# Patient Record
Sex: FEMALE | Race: WHITE | NOT HISPANIC OR LATINO | Employment: FULL TIME | URBAN - METROPOLITAN AREA
[De-identification: names, ages, dates, MRNs, and addresses within clinical notes are randomized per-mention and may not be internally consistent; named-entity substitution may affect disease eponyms.]

---

## 2019-03-02 ENCOUNTER — HOSPITAL ENCOUNTER (OUTPATIENT)
Dept: URGENT CARE | Facility: CLINIC | Age: 24
Discharge: HOME | End: 2019-03-02
Attending: FAMILY MEDICINE

## 2019-03-04 LAB — BACTERIA SPEC AEROBE CULT: NORMAL

## 2020-09-10 ENCOUNTER — HOSPITAL ENCOUNTER (OUTPATIENT)
Dept: LABOR AND DELIVERY | Facility: HOSPITAL | Age: 25
Discharge: HOME | End: 2020-09-10
Attending: OBSTETRICS & GYNECOLOGY

## 2020-09-10 LAB
ALBUMIN SERPL-MCNC: 2.9 G/DL (ref 3.5–5)
ALBUMIN/GLOB SERPL: 1.1 {RATIO} (ref 1.4–2.6)
ALP SERPL-CCNC: 129 U/L (ref 42–98)
ALT SERPL-CCNC: <5 U/L (ref 10–40)
ANION GAP SERPL CALC-SCNC: 14 MMOL/L (ref 8–19)
AST SERPL-CCNC: 9 U/L (ref 15–50)
BASOPHILS # BLD AUTO: 0.02 10*3/UL (ref 0–0.2)
BASOPHILS NFR BLD AUTO: 0.2 % (ref 0–3)
BILIRUB SERPL-MCNC: 0.19 MG/DL (ref 0.2–1.3)
BUN SERPL-MCNC: 4 MG/DL (ref 5–25)
BUN/CREAT SERPL: 8 {RATIO} (ref 6–20)
CALCIUM SERPL-MCNC: 9.3 MG/DL (ref 8.7–10.4)
CHLORIDE SERPL-SCNC: 105 MMOL/L (ref 99–111)
CONV ABS IMM GRAN: 0.06 10*3/UL (ref 0–0.2)
CONV CO2: 21 MMOL/L (ref 22–32)
CONV CREATININE URINE, RANDOM: 219.1 MG/DL (ref 10–300)
CONV IMMATURE GRAN: 0.5 % (ref 0–1.8)
CONV PROTEIN TO CREATININE RATIO (RANDOM URINE): 0.21 {RATIO} (ref 0–0.1)
CONV TOTAL PROTEIN: 5.6 G/DL (ref 6.3–8.2)
CREAT UR-MCNC: 0.52 MG/DL (ref 0.5–0.9)
DEPRECATED RDW RBC AUTO: 40.8 FL (ref 36.4–46.3)
EOSINOPHIL # BLD AUTO: 0.08 10*3/UL (ref 0–0.7)
EOSINOPHIL # BLD AUTO: 0.7 % (ref 0–7)
ERYTHROCYTE [DISTWIDTH] IN BLOOD BY AUTOMATED COUNT: 12.8 % (ref 11.7–14.4)
GFR SERPLBLD BASED ON 1.73 SQ M-ARVRAT: >60 ML/MIN/{1.73_M2}
GLOBULIN UR ELPH-MCNC: 2.7 G/DL (ref 2–3.5)
GLUCOSE SERPL-MCNC: 109 MG/DL (ref 65–99)
HCT VFR BLD AUTO: 30.6 % (ref 37–47)
HGB BLD-MCNC: 10.1 G/DL (ref 12–16)
LYMPHOCYTES # BLD AUTO: 1.66 10*3/UL (ref 1–5)
LYMPHOCYTES NFR BLD AUTO: 15.2 % (ref 20–45)
MCH RBC QN AUTO: 29.1 PG (ref 27–31)
MCHC RBC AUTO-ENTMCNC: 33 G/DL (ref 33–37)
MCV RBC AUTO: 88.2 FL (ref 81–99)
MONOCYTES # BLD AUTO: 0.79 10*3/UL (ref 0.2–1.2)
MONOCYTES NFR BLD AUTO: 7.2 % (ref 3–10)
NEUTROPHILS # BLD AUTO: 8.34 10*3/UL (ref 2–8)
NEUTROPHILS NFR BLD AUTO: 76.2 % (ref 30–85)
NRBC CBCN: 0 % (ref 0–0.7)
OSMOLALITY SERPL CALC.SUM OF ELEC: 279 MOSM/KG (ref 273–304)
PLATELET # BLD AUTO: 220 10*3/UL (ref 130–400)
PMV BLD AUTO: 10.2 FL (ref 9.4–12.3)
POTASSIUM SERPL-SCNC: 3.8 MMOL/L (ref 3.5–5.3)
PROT UR-MCNC: 45.1 MG/DL
RBC # BLD AUTO: 3.47 10*6/UL (ref 4.2–5.4)
SODIUM SERPL-SCNC: 136 MMOL/L (ref 135–147)
WBC # BLD AUTO: 10.95 10*3/UL (ref 4.8–10.8)

## 2021-11-15 ENCOUNTER — OFFICE VISIT (OUTPATIENT)
Dept: OBSTETRICS AND GYNECOLOGY | Facility: CLINIC | Age: 26
End: 2021-11-15

## 2021-11-15 ENCOUNTER — LAB (OUTPATIENT)
Dept: LAB | Facility: HOSPITAL | Age: 26
End: 2021-11-15

## 2021-11-15 ENCOUNTER — TELEPHONE (OUTPATIENT)
Dept: OBSTETRICS AND GYNECOLOGY | Facility: CLINIC | Age: 26
End: 2021-11-15

## 2021-11-15 VITALS
BODY MASS INDEX: 36.82 KG/M2 | WEIGHT: 221 LBS | HEIGHT: 65 IN | SYSTOLIC BLOOD PRESSURE: 119 MMHG | HEART RATE: 102 BPM | DIASTOLIC BLOOD PRESSURE: 81 MMHG

## 2021-11-15 DIAGNOSIS — N93.9 ABNORMAL UTERINE BLEEDING (AUB): Primary | ICD-10-CM

## 2021-11-15 DIAGNOSIS — Z72.0 TOBACCO ABUSE: Chronic | ICD-10-CM

## 2021-11-15 DIAGNOSIS — N93.9 ABNORMAL UTERINE BLEEDING (AUB): ICD-10-CM

## 2021-11-15 DIAGNOSIS — E66.9 OBESITY (BMI 30-39.9): ICD-10-CM

## 2021-11-15 DIAGNOSIS — N91.2 AMENORRHEA: ICD-10-CM

## 2021-11-15 LAB
DEPRECATED RDW RBC AUTO: 40.7 FL (ref 37–54)
ERYTHROCYTE [DISTWIDTH] IN BLOOD BY AUTOMATED COUNT: 12 % (ref 12.3–15.4)
HCG INTACT+B SERPL-ACNC: <0.5 MIU/ML
HCT VFR BLD AUTO: 38.1 % (ref 34–46.6)
HGB BLD-MCNC: 13.5 G/DL (ref 12–15.9)
MCH RBC QN AUTO: 32.8 PG (ref 26.6–33)
MCHC RBC AUTO-ENTMCNC: 35.4 G/DL (ref 31.5–35.7)
MCV RBC AUTO: 92.5 FL (ref 79–97)
PLATELET # BLD AUTO: 292 10*3/MM3 (ref 140–450)
PMV BLD AUTO: 10.8 FL (ref 6–12)
PROLACTIN SERPL-MCNC: 9.14 NG/ML (ref 4.79–23.3)
RBC # BLD AUTO: 4.12 10*6/MM3 (ref 3.77–5.28)
T4 FREE SERPL-MCNC: 1.09 NG/DL (ref 0.93–1.7)
TSH SERPL DL<=0.05 MIU/L-ACNC: 1.35 UIU/ML (ref 0.27–4.2)
WBC # BLD AUTO: 13.95 10*3/MM3 (ref 3.4–10.8)

## 2021-11-15 PROCEDURE — 36415 COLL VENOUS BLD VENIPUNCTURE: CPT

## 2021-11-15 PROCEDURE — 84146 ASSAY OF PROLACTIN: CPT

## 2021-11-15 PROCEDURE — 99214 OFFICE O/P EST MOD 30 MIN: CPT | Performed by: OBSTETRICS & GYNECOLOGY

## 2021-11-15 PROCEDURE — 84702 CHORIONIC GONADOTROPIN TEST: CPT

## 2021-11-15 PROCEDURE — 84443 ASSAY THYROID STIM HORMONE: CPT

## 2021-11-15 PROCEDURE — 85027 COMPLETE CBC AUTOMATED: CPT

## 2021-11-15 PROCEDURE — 84439 ASSAY OF FREE THYROXINE: CPT

## 2021-11-15 RX ORDER — MEDROXYPROGESTERONE ACETATE 10 MG/1
5 TABLET ORAL DAILY
Qty: 5 TABLET | Refills: 0 | Status: SHIPPED | OUTPATIENT
Start: 2021-11-15 | End: 2021-11-20

## 2021-11-18 PROBLEM — E66.9 OBESITY (BMI 30-39.9): Chronic | Status: ACTIVE | Noted: 2021-11-15

## 2021-11-18 PROBLEM — N93.9 ABNORMAL UTERINE BLEEDING (AUB): Chronic | Status: ACTIVE | Noted: 2021-11-15

## 2021-11-18 PROBLEM — Z72.0 TOBACCO ABUSE: Chronic | Status: ACTIVE | Noted: 2021-11-18

## 2021-11-19 NOTE — ASSESSMENT & PLAN NOTE
Discussed diet, nutrition, exercise and weight loss and how weight loss can positively impact the patient's menstrual cycle regularity

## 2021-12-09 ENCOUNTER — OFFICE VISIT (OUTPATIENT)
Dept: OBSTETRICS AND GYNECOLOGY | Facility: CLINIC | Age: 26
End: 2021-12-09

## 2021-12-09 VITALS
BODY MASS INDEX: 37.32 KG/M2 | DIASTOLIC BLOOD PRESSURE: 83 MMHG | SYSTOLIC BLOOD PRESSURE: 129 MMHG | HEART RATE: 96 BPM | WEIGHT: 224 LBS | HEIGHT: 65 IN

## 2021-12-09 DIAGNOSIS — N91.2 AMENORRHEA: ICD-10-CM

## 2021-12-09 DIAGNOSIS — N93.9 ABNORMAL UTERINE BLEEDING (AUB): Primary | Chronic | ICD-10-CM

## 2021-12-09 DIAGNOSIS — Z30.09 ENCOUNTER FOR COUNSELING REGARDING CONTRACEPTION: ICD-10-CM

## 2021-12-09 DIAGNOSIS — E66.9 OBESITY (BMI 30-39.9): Chronic | ICD-10-CM

## 2021-12-09 DIAGNOSIS — Z72.0 TOBACCO ABUSE: Chronic | ICD-10-CM

## 2021-12-09 PROCEDURE — 99214 OFFICE O/P EST MOD 30 MIN: CPT | Performed by: OBSTETRICS & GYNECOLOGY

## 2021-12-09 NOTE — PROGRESS NOTES
"GYN Visit    CC: Follow-up ultrasound    HPI:   26 y.o. who presents in follow-up of abnormal uterine bleeding/amenorrhea.  The patient was given a Provera withdrawal.  She did have 2 days of bleeding in response to the Provera.  This was very light.  She has had no bleeding since.  She has no other complaints.  She does desire contraception.    History: PMHx, Meds, Allergies, PSHx, Social Hx, and POBHx all reviewed and updated.    /83   Pulse 96   Ht 165.1 cm (65\")   Wt 102 kg (224 lb)   LMP  (LMP Unknown)   Breastfeeding No   BMI 37.28 kg/m²     Physical Exam  Vitals and nursing note reviewed.   Constitutional:       General: She is not in acute distress.     Appearance: Normal appearance. She is obese. She is not ill-appearing.   Abdominal:      General: Abdomen is flat. There is no distension.      Palpations: Abdomen is soft. There is no mass.      Tenderness: There is no abdominal tenderness. There is no guarding or rebound.      Hernia: No hernia is present.   Musculoskeletal:         General: No swelling.      Right lower leg: No edema.      Left lower leg: No edema.   Skin:     General: Skin is warm and dry.   Neurological:      Mental Status: She is alert and oriented to person, place, and time.   Psychiatric:         Mood and Affect: Mood normal.         Behavior: Behavior normal.         Thought Content: Thought content normal.     Ultrasound 2021: Indication abnormal uterine bleeding/amenorrhea.  Comparisons none.  The uterus is 8.3 cm x 5.4 cm x 3.9 cm.  The endometrium is 5.2 mm.  The left ovary 3.0 cm x 3.5 cm and the right ovary is 2.2 cm x 1.1 cm.  There is a single anterior subserosal fibroid measuring 2.1 x 1.5 cm.  There is a simple cyst arising from the left ovary measuring 2.4 x 1.8 cm.  The uterus is anteverted.  There was an echogenic area within the endometrial lining.  This measured 5 x 3 mm.  This could represent an endometrial polyp, although this is thought to " be less likely due to the thin endometrium.      ASSESSMENT AND PLAN:  Diagnoses and all orders for this visit:    1. Abnormal uterine bleeding (AUB) (Primary)  Assessment & Plan:  The patient's endometrium is thin  This is likely why she had only light bleeding with her Provera withdrawal.  I suspect her AUB is likely secondary to anovulation cycles.  The patient is interested in proceeding with contraception as well as medication to control her cycles.  After reviewing different options for managing both of these desires the patient has elected proceed with Xulane patches    Orders:  -     norelgestromin-ethinyl estradiol (ORTHO EVRA) 150-35 MCG/24HR; Place 1 patch on the skin as directed by provider 1 (One) Time Per Week.  Dispense: 3 patch; Refill: 12    2. Tobacco abuse  Assessment & Plan:  Smoking cessation counseling      3. Obesity (BMI 30-39.9)  Assessment & Plan:  Discussed nutrition, exercise and recommended weight loss.      4. Amenorrhea  Assessment & Plan:  The patient did have a response to the Provera albeit it was a very small response with only light bleeding for 2 days.      5. Encounter for counseling regarding contraception  Assessment & Plan:  Xulane patches  Apply 1 patch each week to the skin, rotating patch site.  Appropriate patch sites were reviewed with the patient.  Appropriate use was reviewed with the patient.  The risks, benefits, and alternatives of birth control patches were reviewed including increased risk for venous thromboembolism.        Counseling: TRACK MENSES, RTO if <q21d, >7d long, heavy or painful.    All BIRTH CONTROL options R/B/A/SE/E of each reviewed in detail.  OCP/hormone use risk THROMBOEMBOLIC RISK reviewed.     Weight loss/Nutrition reviewed.  Tobacco cessation reviewed.    Follow Up:  Return in about 3 months (around 3/9/2022) for Recheck.    Malick Sorto MD  12/09/2021

## 2021-12-10 PROBLEM — Z30.09 ENCOUNTER FOR COUNSELING REGARDING CONTRACEPTION: Status: ACTIVE | Noted: 2021-12-10

## 2021-12-10 NOTE — ASSESSMENT & PLAN NOTE
Xulane patches  Apply 1 patch each week to the skin, rotating patch site.  Appropriate patch sites were reviewed with the patient.  Appropriate use was reviewed with the patient.  The risks, benefits, and alternatives of birth control patches were reviewed including increased risk for venous thromboembolism.

## 2021-12-10 NOTE — ASSESSMENT & PLAN NOTE
The patient's endometrium is thin  This is likely why she had only light bleeding with her Provera withdrawal.  I suspect her AUB is likely secondary to anovulation cycles.  The patient is interested in proceeding with contraception as well as medication to control her cycles.  After reviewing different options for managing both of these desires the patient has elected proceed with Xulane patches

## 2021-12-10 NOTE — ASSESSMENT & PLAN NOTE
The patient did have a response to the Provera albeit it was a very small response with only light bleeding for 2 days.

## 2023-10-12 ENCOUNTER — TELEPHONE (OUTPATIENT)
Dept: OBSTETRICS AND GYNECOLOGY | Facility: CLINIC | Age: 28
End: 2023-10-12
Payer: COMMERCIAL

## 2023-10-12 NOTE — TELEPHONE ENCOUNTER
Called patient to schedule ob ultrasound per protocol. Patient informed me that she has started her ob care with Dr. Shelby. I informed patient that the practice is not accepting transfer of ob care. Patients New Ob appt has been canceled.

## 2023-10-13 ENCOUNTER — TELEPHONE (OUTPATIENT)
Dept: OBSTETRICS AND GYNECOLOGY | Facility: CLINIC | Age: 28
End: 2023-10-13
Payer: COMMERCIAL

## 2023-10-13 NOTE — TELEPHONE ENCOUNTER
L/m for pt to call back.      Ok to schedule first available New Ob w/provider of pt's choice.  Approx 14 wks, seen at [facility].      Will pt have a problem coming to [place] for appointments once per month until 28 wks along, then once every two weeks until 36 wks along, then weekly until delivery?      Pt # [phone]

## 2023-11-07 ENCOUNTER — INITIAL PRENATAL (OUTPATIENT)
Dept: OBSTETRICS AND GYNECOLOGY | Facility: CLINIC | Age: 28
End: 2023-11-07
Payer: COMMERCIAL

## 2023-11-07 VITALS — SYSTOLIC BLOOD PRESSURE: 129 MMHG | DIASTOLIC BLOOD PRESSURE: 75 MMHG | BODY MASS INDEX: 38.77 KG/M2 | WEIGHT: 233 LBS

## 2023-11-07 DIAGNOSIS — Z87.59 HISTORY OF POSTPARTUM HEMORRHAGE: ICD-10-CM

## 2023-11-07 DIAGNOSIS — O98.512 COVID-19 AFFECTING PREGNANCY IN SECOND TRIMESTER: ICD-10-CM

## 2023-11-07 DIAGNOSIS — Z98.891 PREVIOUS CESAREAN SECTION: ICD-10-CM

## 2023-11-07 DIAGNOSIS — Z3A.18 18 WEEKS GESTATION OF PREGNANCY: ICD-10-CM

## 2023-11-07 DIAGNOSIS — U07.1 COVID-19 AFFECTING PREGNANCY IN SECOND TRIMESTER: ICD-10-CM

## 2023-11-07 DIAGNOSIS — O99.332 MATERNAL TOBACCO USE IN SECOND TRIMESTER: ICD-10-CM

## 2023-11-07 DIAGNOSIS — Z34.82 MULTIGRAVIDA IN SECOND TRIMESTER: Primary | ICD-10-CM

## 2023-11-07 LAB
EXPIRATION DATE: NORMAL
GLUCOSE UR STRIP-MCNC: NEGATIVE MG/DL
Lab: NORMAL
PROT UR STRIP-MCNC: NEGATIVE MG/DL

## 2023-11-07 NOTE — PROGRESS NOTES
"Cc:  Transfer care.  Patient was not on birth control at the time of conception.  She is transferring care from Dr. Shelby.  Pregnancy has been uncomplicated to this point.  Patient with some \"growing\" pains and anatomic changes.  No bleeding or spotting.  Past medical, surgical, obstetric, genetic, social and family histories reviewed by me.  Allergies and medications reviewed.  Physical exam documented by me.  Prenatal records reviewed.  A/P:  IUP at 18 weeks with previous  complicated by postpartum hemorrhage, maternal tobacco use.  - C/S.  From review of records, patient had arrest disorder.  She dilated to 6 cm and did not progress after 4+ hours with adequate labor.  She is suboptimal TOLAC candidate; however, if infant is SGA and she has a favorable cervix, TOLAC can be considered.  - PPH.  Unknown etiology.  Likely atony.  Discussed increased risks in successive pregnancy.  Will ensure that anemia is managed antepartum and prepared for increased risks in this pregnancy.  - Maternal tobacco.  Recommended patient quit smoking and discussed risks including IUGR, PTD, IUFD.  - Recommended vaccinations.  Patient had Covid in current pregnancy.  - Proceed with dental work as discussed.  "

## 2023-11-28 ENCOUNTER — ROUTINE PRENATAL (OUTPATIENT)
Dept: OBSTETRICS AND GYNECOLOGY | Facility: CLINIC | Age: 28
End: 2023-11-28
Payer: COMMERCIAL

## 2023-11-28 VITALS — WEIGHT: 236 LBS | BODY MASS INDEX: 39.27 KG/M2 | DIASTOLIC BLOOD PRESSURE: 79 MMHG | SYSTOLIC BLOOD PRESSURE: 130 MMHG

## 2023-11-28 DIAGNOSIS — Z3A.21 21 WEEKS GESTATION OF PREGNANCY: Primary | ICD-10-CM

## 2023-11-28 DIAGNOSIS — Z98.891 PREVIOUS CESAREAN SECTION: ICD-10-CM

## 2023-11-28 DIAGNOSIS — Z34.82 MULTIGRAVIDA IN SECOND TRIMESTER: ICD-10-CM

## 2023-11-28 DIAGNOSIS — Z23 FLU VACCINE NEED: ICD-10-CM

## 2023-11-28 DIAGNOSIS — O99.332 MATERNAL TOBACCO USE IN SECOND TRIMESTER: ICD-10-CM

## 2023-11-28 NOTE — PROGRESS NOTES
Cc:  Pregnancy follow up.  No complaints.  Patient had a wisdom tooth extracted last week.  She reports some fetal movement.  No bleeding.  Vitals reviewed by me.  Gen - alert and pleasant.  Abdomen - nontender.  Ultrasound with completed anatomy.  A/P:  IUP at 22 weeks with previous C/S  - Patient received Flu vaccine today.   - Reviewed records from delivery in [facility].  Patient appears to have had an arrest disorder.  Discussed evaluating fetal weight in 3rd trimester and deciding on plan for delivery.  - Discussed maternal well being.

## 2023-11-29 ENCOUNTER — REFERRAL TRIAGE (OUTPATIENT)
Dept: LABOR AND DELIVERY | Facility: HOSPITAL | Age: 28
End: 2023-11-29
Payer: COMMERCIAL

## 2023-12-18 ENCOUNTER — PATIENT OUTREACH (OUTPATIENT)
Dept: LABOR AND DELIVERY | Facility: HOSPITAL | Age: 28
End: 2023-12-18
Payer: COMMERCIAL

## 2023-12-18 NOTE — OUTREACH NOTE
[facility]  Enrollment    Current Estimated Gestational Age: 25w2d    Questions/Answers      Flowsheet Row Responses   Would like to participate? 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 Yes   Dentist Name [facility]   Next Appointment: 12/20/23   Resources Presently Utilizing: WIC (Women, Infant, Children)   Maternal Warning Signs Provided   Other Education HANDS, WIC Benefits, Insurance benefits/Incentives, Smoking/Vaping Cessation            Learning Assessment      Flowsheet Row Responses   Relationship Patient   Learner Name Cammy   Does the learner have any barriers to learning? No Barriers   What is the preferred language of the learner for medical teaching? English   Is an  required? No   How does the learner prefer to learn new concepts? 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 good   May I ask you questions about your substance use? Yes   Other Comment 1/2 PPD smoker   Supplies ready for baby Crib   Resource/Environmental Concerns None   Do you have any questions related to your care experience, your pregnancy, plans for delivery, any concerns, etc? No   Other Education HANDS, WIC Benefits, Insurance benefits/Incentives, Smoking/Vaping Cessation          [facility]  updated and reviewed with the patient during this program:  Financial Resource Strain: Medium Risk (12/18/2023)    Overall Financial Resource Strain (CARDIA)     Difficulty of Paying Living Expenses: Somewhat hard      Food Insecurity: Food Insecurity Present (12/18/2023)    Hunger Vital Sign     Worried About Running Out of Food in the Last Year: Often true     Ran Out of Food in the Last Year: Never true      Social Connections: Not At Risk (12/18/2023)    Family and Community Support     Help with Day-to-Day Activities: I don't need any help     Lonely or Isolated: Sometimes      Transportation Needs: No Transportation Needs (12/18/2023)    PRAPARE - Transportation     Lack of Transportation (Medical): No     Lack of Transportation (Non-Medical): No      Housing Stability: Not At Risk (12/18/2023)    Housing Stability     Current Living Arrangements: other (see comments)     Potentially Unsafe Housing Conditions: none      Stress: Not on file       Intake completed today. She lives with the FOB and daughter and reports stable housing and reliable transportation. She receives WIC/SNAP. Reviewed ordering a breast pump and contacting [employer] about car seat. She is interested in TOLAC if possible, answered questions. Reviewed maternal warning signs and discussed smoking cessation. F/u in one month.     Inga Vanessa RN  Maternity Nurse Navigator    12/18/2023, 16:13 EST

## 2023-12-27 ENCOUNTER — ROUTINE PRENATAL (OUTPATIENT)
Dept: OBSTETRICS AND GYNECOLOGY | Facility: CLINIC | Age: 28
End: 2023-12-27
Payer: COMMERCIAL

## 2023-12-27 VITALS — SYSTOLIC BLOOD PRESSURE: 118 MMHG | DIASTOLIC BLOOD PRESSURE: 70 MMHG | BODY MASS INDEX: 39.77 KG/M2 | WEIGHT: 239 LBS

## 2023-12-27 DIAGNOSIS — Z34.82 MULTIGRAVIDA IN SECOND TRIMESTER: Primary | ICD-10-CM

## 2023-12-27 DIAGNOSIS — Z98.891 PREVIOUS CESAREAN SECTION: ICD-10-CM

## 2023-12-27 DIAGNOSIS — O99.332 MATERNAL TOBACCO USE IN SECOND TRIMESTER: ICD-10-CM

## 2023-12-27 DIAGNOSIS — Z3A.26 26 WEEKS GESTATION OF PREGNANCY: ICD-10-CM

## 2023-12-27 NOTE — PROGRESS NOTES
Cc:  Pregnancy follow up.  No complaints.  Good FM.  No bleeding or spotting.    Vitals reviewed by me.  Gen - alert and pleasant.  Abdomen - nontender, no guarding or rebound.  Patient needs to complete GTT1.  A/P:  IUP at 26+ weeks  - Glucola discussed.  Patient to complete in 1 to 2 weeks.  - Sonogram for growth in early 3rd trimester.  - Discussed materanl well being.

## 2024-01-06 LAB — GLUCOSE 1H P 50 G GLC PO SERPL-MCNC: 121 MG/DL (ref 70–139)

## 2024-01-15 ENCOUNTER — PATIENT OUTREACH (OUTPATIENT)
Dept: LABOR AND DELIVERY | Facility: HOSPITAL | Age: 29
End: 2024-01-15
Payer: COMMERCIAL

## 2024-01-15 NOTE — OUTREACH NOTE
[facility]  Check-In    Current Estimated Gestational Age: 29w2d      Questions/Answers      Flowsheet Row Responses   Best Method for Contacting Cell   Demographics Reviewed Yes   Currently Employed Yes   Able to keep appointments as scheduled Yes   Gender(s) and Name(s) girl   Baby Active/Feeling Fetal Movemen Yes   How are you presently feeling? good   Supplies ready for baby Crib   Resource/Environmental Concerns None   Do you have any questions related to your care experience, your pregnancy, plans for delivery, any concerns, etc? No   Other Education [facility] Benefits, Insurance benefits/Incentives          Pt doing well. We discussed fetal kick counts and she reports active fetal movement. She is still working on infant supplies and she will ask office to fax the order for her breast pump. F/u in one month.     Inga Vanessa RN  Maternity Nurse Navigator    1/15/2024, 12:41 EST

## 2024-01-16 ENCOUNTER — ROUTINE PRENATAL (OUTPATIENT)
Dept: OBSTETRICS AND GYNECOLOGY | Facility: CLINIC | Age: 29
End: 2024-01-16
Payer: COMMERCIAL

## 2024-01-16 VITALS — BODY MASS INDEX: 39.61 KG/M2 | WEIGHT: 238 LBS | DIASTOLIC BLOOD PRESSURE: 72 MMHG | SYSTOLIC BLOOD PRESSURE: 126 MMHG

## 2024-01-16 DIAGNOSIS — O99.513: ICD-10-CM

## 2024-01-16 DIAGNOSIS — O26.843 FUNDAL HEIGHT LOW FOR DATES IN THIRD TRIMESTER: ICD-10-CM

## 2024-01-16 DIAGNOSIS — Z98.891 PREVIOUS CESAREAN SECTION: ICD-10-CM

## 2024-01-16 DIAGNOSIS — Z34.83 MULTIGRAVIDA IN THIRD TRIMESTER: Primary | ICD-10-CM

## 2024-01-16 DIAGNOSIS — Z3A.29 29 WEEKS GESTATION OF PREGNANCY: ICD-10-CM

## 2024-01-16 DIAGNOSIS — O99.332 MATERNAL TOBACCO USE IN SECOND TRIMESTER: ICD-10-CM

## 2024-01-16 LAB
EXPIRATION DATE: ABNORMAL
GLUCOSE UR STRIP-MCNC: NEGATIVE MG/DL
Lab: ABNORMAL
PROT UR STRIP-MCNC: ABNORMAL MG/DL

## 2024-01-16 NOTE — PROGRESS NOTES
Cc:  Pregnancy follow up.  Patient with persistent cough since before , she states has taken a Covid test that she reports to have been negative. She reports possible influenza and RSV exposure.  No bleeding or spotting.  Fetal movement is excellent.  Vitals reviewed by me.  Gen - alert and pleasant.  Abdomen - nontender.  A/P:  IUP at 29 weeks with URI, fundal height low.  - URI.  A medication list was given.  Discussed importance of quitting smoking and increase duration of symptoms in pregnant women who smoke.  Recommended presenting to Urgent Care for testing.  - Tobacco use.  Discussed risks in pregnancy including IUGR, IUFD,  birth.  Encouraged to remain quit from smoking.  - Fundal height low.  Sonogram ordered.  - Discussed maternal well being.

## 2024-02-06 ENCOUNTER — ROUTINE PRENATAL (OUTPATIENT)
Dept: OBSTETRICS AND GYNECOLOGY | Facility: CLINIC | Age: 29
End: 2024-02-06
Payer: COMMERCIAL

## 2024-02-06 VITALS — BODY MASS INDEX: 39.61 KG/M2 | WEIGHT: 238 LBS | SYSTOLIC BLOOD PRESSURE: 137 MMHG | DIASTOLIC BLOOD PRESSURE: 78 MMHG

## 2024-02-06 DIAGNOSIS — Z3A.32 32 WEEKS GESTATION OF PREGNANCY: Primary | ICD-10-CM

## 2024-02-06 LAB
GLUCOSE UR STRIP-MCNC: NEGATIVE MG/DL
PROT UR STRIP-MCNC: NEGATIVE MG/DL

## 2024-02-06 PROCEDURE — 99213 OFFICE O/P EST LOW 20 MIN: CPT | Performed by: OBSTETRICS & GYNECOLOGY

## 2024-02-06 RX ORDER — PRENATAL VIT/IRON FUM/FOLIC AC 27MG-0.8MG
TABLET ORAL DAILY
COMMUNITY

## 2024-02-06 RX ORDER — AMOXICILLIN 875 MG/1
TABLET, COATED ORAL
COMMUNITY
Start: 2024-01-22

## 2024-02-06 NOTE — PROGRESS NOTES
Cc:  Pregnancy follow up.  Patient feels well.  Good FM.  No bleeding or spotting.  Vitals reviewed by me  Gen - alert and pleasant.  Abdomen - gravid, nontender  Ultrasound with normal growth, JUAN, cephalic.  A/P:  IUP at 32 weeks with previous  for arrest disorder.  - Infant is AGA for growth.  Therefore, she has a risk of arrest disorder if she attempts TOLAC.  We can continue to discuss but based on previous results, she had a normal size infant with arrest of labor.  She will continue to consider.  - Discussed maternal well being.

## 2024-02-22 ENCOUNTER — PATIENT OUTREACH (OUTPATIENT)
Dept: LABOR AND DELIVERY | Facility: HOSPITAL | Age: 29
End: 2024-02-22
Payer: COMMERCIAL

## 2024-02-22 NOTE — OUTREACH NOTE
[facility]  Unable to Reach       Questions/Answers      Flowsheet Row Responses   Pending Outreach Prenatal Check-in   Call Attempt First   Outcome Left message   Next Call Attempt Date 02/26/24            Inga Vanessa RN  Maternity Nurse Navigator    2/22/2024, 11:48 EST

## 2024-02-26 ENCOUNTER — PATIENT OUTREACH (OUTPATIENT)
Dept: LABOR AND DELIVERY | Facility: HOSPITAL | Age: 29
End: 2024-02-26
Payer: COMMERCIAL

## 2024-02-26 NOTE — OUTREACH NOTE
[facility]  Check-In    Current Estimated Gestational Age: 35w2d      Questions/Answers      Flowsheet Row Responses   Best Method for Contacting Cell   Demographics Reviewed Yes   Currently Employed Yes   Able to keep appointments as scheduled Yes   Gender(s) and Name(s) girl   How are you presently feeling? good   Supplies ready for baby Car Seat, Clothing, Crib, Diapers, Feeding Supplies   Resource/Environmental Concerns None   Do you have any questions related to your care experience, your pregnancy, plans for delivery, any concerns, etc? No   Other Education Insurance benefits/Incentives, WIC Benefits          Pt doing well, still deciding about  vs c/s and will discuss further with provider at next appt. She has all necessary infant supplies, just need to get her breast pump IP. Reviewed labor precautions and she reports active fetal movement. F/u inpatient after delivery.     Inga Vanessa RN  Maternity Nurse Navigator    2024, 14:21 EST

## 2024-02-27 ENCOUNTER — ROUTINE PRENATAL (OUTPATIENT)
Dept: OBSTETRICS AND GYNECOLOGY | Facility: CLINIC | Age: 29
End: 2024-02-27
Payer: COMMERCIAL

## 2024-02-27 VITALS — DIASTOLIC BLOOD PRESSURE: 90 MMHG | SYSTOLIC BLOOD PRESSURE: 138 MMHG | BODY MASS INDEX: 40.27 KG/M2 | WEIGHT: 242 LBS

## 2024-02-27 DIAGNOSIS — Z3A.35 35 WEEKS GESTATION OF PREGNANCY: ICD-10-CM

## 2024-02-27 DIAGNOSIS — Z34.83 MULTIGRAVIDA IN THIRD TRIMESTER: Primary | ICD-10-CM

## 2024-02-27 DIAGNOSIS — Z98.891 PREVIOUS CESAREAN SECTION: ICD-10-CM

## 2024-02-27 NOTE — PROGRESS NOTES
Cc:  Pregnancy follow up.  Patient seeing spots x1 week; she also c/o neck pain that radiates to the back of her head x2 weeks. Patient states she is having trouble seeing even with her glasses on but plans to make an eye appointment.  Fetal movement is excellent.  No bleeding or spotting.  No cramping/contractions.  Vitals reviewed by me.  Gen - alert and pleasant.  Abdomen - gravid, nontender.  A/P:  IUP at 35 weeks with previous , headache  - Previous C/S.  Situation complex.   Patient with previous arrest disorder.  Infant was AGA and sonogram measurements suggest similar circumstances.  Reviewed that if she is motivated to TOLAC/, she can consider but might have increased risks of arrest disorder.  One option is to schedule  near EDC and if spontaneous labor ensues previous to this, she could attempt to TOLAC.  The rationale for this was explored in dept.  Emphasized that previous  was likely not due to the induction but predetermined factors.  - Headache.  Likely musculoskeletal.  - Discussed maternal well being.    I spent greater than 45 minutes with patient reviewing pros/cons of  and issues with trial of labor.

## 2024-03-05 ENCOUNTER — ROUTINE PRENATAL (OUTPATIENT)
Dept: OBSTETRICS AND GYNECOLOGY | Facility: CLINIC | Age: 29
End: 2024-03-05
Payer: COMMERCIAL

## 2024-03-05 ENCOUNTER — TELEPHONE (OUTPATIENT)
Dept: OBSTETRICS AND GYNECOLOGY | Facility: CLINIC | Age: 29
End: 2024-03-05

## 2024-03-05 DIAGNOSIS — Z98.891 PREVIOUS CESAREAN SECTION: ICD-10-CM

## 2024-03-05 DIAGNOSIS — Z34.83 MULTIGRAVIDA IN THIRD TRIMESTER: Primary | ICD-10-CM

## 2024-03-05 DIAGNOSIS — Z3A.36 36 WEEKS GESTATION OF PREGNANCY: ICD-10-CM

## 2024-03-05 PROCEDURE — 99214 OFFICE O/P EST MOD 30 MIN: CPT | Performed by: OBSTETRICS & GYNECOLOGY

## 2024-03-05 NOTE — PROGRESS NOTES
Cc:  Pregnancy follow up.  No complaints.  Good FM.  No bleeding or spotting.   Good FM.   Vitals reviewed by me.  Gen - alert and pleasant.  Abdomen - gravid, nontender  GBS done.   A/P:  IUP at 36 weeks with previous  for arrest disorder.  - C/S.  ERAS information given.  Patient will consider TOLAC if she enters active labor prior to  date.  She has an increased risk of failed TOLAC due to history of arrest disorder.  Will make assessment of growth/EFW in 1 to 2 weeks.  - Discussed maternal well being.

## 2024-03-08 LAB — GP B STREP DNA SPEC QL NAA+PROBE: NEGATIVE

## 2024-03-12 ENCOUNTER — ROUTINE PRENATAL (OUTPATIENT)
Dept: OBSTETRICS AND GYNECOLOGY | Facility: CLINIC | Age: 29
End: 2024-03-12
Payer: COMMERCIAL

## 2024-03-12 VITALS — BODY MASS INDEX: 40.44 KG/M2 | DIASTOLIC BLOOD PRESSURE: 85 MMHG | SYSTOLIC BLOOD PRESSURE: 139 MMHG | WEIGHT: 243 LBS

## 2024-03-12 DIAGNOSIS — Z3A.37 37 WEEKS GESTATION OF PREGNANCY: ICD-10-CM

## 2024-03-12 DIAGNOSIS — Z98.891 PREVIOUS CESAREAN SECTION: ICD-10-CM

## 2024-03-12 DIAGNOSIS — Z34.83 MULTIGRAVIDA IN THIRD TRIMESTER: Primary | ICD-10-CM

## 2024-03-14 NOTE — PROGRESS NOTES
Cc:  Pregnancy follow up.  No complaints.  Good FM.  No bleeding.    Vitals reviewed by me.  Gen - alert and pleasant.  Abdomen - gravid, nontender.  Cervix - 2  A/P:  IUP at 37 weeks with previous   - Patient still with unfavorable cervix.  Patient also likely with android pelvis.  However, will reassess fetal weight next week.

## 2024-03-19 ENCOUNTER — ROUTINE PRENATAL (OUTPATIENT)
Dept: OBSTETRICS AND GYNECOLOGY | Facility: CLINIC | Age: 29
End: 2024-03-19
Payer: COMMERCIAL

## 2024-03-19 VITALS — SYSTOLIC BLOOD PRESSURE: 134 MMHG | BODY MASS INDEX: 40.27 KG/M2 | WEIGHT: 242 LBS | DIASTOLIC BLOOD PRESSURE: 80 MMHG

## 2024-03-19 DIAGNOSIS — Z98.891 PREVIOUS CESAREAN SECTION: ICD-10-CM

## 2024-03-19 DIAGNOSIS — Z34.83 MULTIGRAVIDA IN THIRD TRIMESTER: Primary | ICD-10-CM

## 2024-03-19 PROCEDURE — 99213 OFFICE O/P EST LOW 20 MIN: CPT | Performed by: OBSTETRICS & GYNECOLOGY

## 2024-03-19 NOTE — PROGRESS NOTES
Cc:  Pregnancy follow up.  Patient has had some nausea, mainly at night after dinner.  Good FM.  No bleeding or spotting.  Patient denies any significant contractions or pain.  Vitals reviewed by me.  Gen - alert and pleasant.  Abdomen - gravid, nontender  A/P:  IUP at 38+ weeks with previous .  - Patient is not planning to TOLAC at this stage.  Reviewed process for RLTC/S next week.  - Discussed maternal well being.

## 2024-03-20 ENCOUNTER — TELEPHONE (OUTPATIENT)
Dept: OBSTETRICS AND GYNECOLOGY | Facility: CLINIC | Age: 29
End: 2024-03-20
Payer: COMMERCIAL

## 2024-03-27 ENCOUNTER — ANESTHESIA (OUTPATIENT)
Dept: LABOR AND DELIVERY | Facility: HOSPITAL | Age: 29
End: 2024-03-27
Payer: COMMERCIAL

## 2024-03-27 ENCOUNTER — ANESTHESIA EVENT (OUTPATIENT)
Dept: LABOR AND DELIVERY | Facility: HOSPITAL | Age: 29
End: 2024-03-27
Payer: COMMERCIAL

## 2024-03-27 ENCOUNTER — HOSPITAL ENCOUNTER (INPATIENT)
Facility: HOSPITAL | Age: 29
LOS: 2 days | Discharge: HOME | End: 2024-03-29
Attending: OBSTETRICS & GYNECOLOGY | Admitting: OBSTETRICS & GYNECOLOGY
Payer: COMMERCIAL

## 2024-03-27 PROBLEM — Z98.891 PREVIOUS CESAREAN SECTION: Status: ACTIVE | Noted: 2024-03-27

## 2024-03-27 PROBLEM — Z34.90 PREGNANCY: Status: ACTIVE | Noted: 2024-03-27

## 2024-03-27 LAB
ABO GROUP BLD: NORMAL
BASOPHILS # BLD AUTO: 0.02 10*3/MM3 (ref 0–0.2)
BASOPHILS NFR BLD AUTO: 0.2 % (ref 0–1.5)
BLD GP AB SCN SERPL QL: NEGATIVE
DEPRECATED RDW RBC AUTO: 43.2 FL (ref 37–54)
EOSINOPHIL # BLD AUTO: 0.07 10*3/MM3 (ref 0–0.4)
EOSINOPHIL NFR BLD AUTO: 0.7 % (ref 0.3–6.2)
ERYTHROCYTE [DISTWIDTH] IN BLOOD BY AUTOMATED COUNT: 13.2 % (ref 12.3–15.4)
HCT VFR BLD AUTO: 34.4 % (ref 34–46.6)
HGB BLD-MCNC: 11.4 G/DL (ref 12–15.9)
IMM GRANULOCYTES # BLD AUTO: 0.05 10*3/MM3 (ref 0–0.05)
IMM GRANULOCYTES NFR BLD AUTO: 0.5 % (ref 0–0.5)
LYMPHOCYTES # BLD AUTO: 1.71 10*3/MM3 (ref 0.7–3.1)
LYMPHOCYTES NFR BLD AUTO: 15.9 % (ref 19.6–45.3)
MCH RBC QN AUTO: 29.8 PG (ref 26.6–33)
MCHC RBC AUTO-ENTMCNC: 33.1 G/DL (ref 31.5–35.7)
MCV RBC AUTO: 89.8 FL (ref 79–97)
MONOCYTES # BLD AUTO: 0.66 10*3/MM3 (ref 0.1–0.9)
MONOCYTES NFR BLD AUTO: 6.1 % (ref 5–12)
NEUTROPHILS NFR BLD AUTO: 76.6 % (ref 42.7–76)
NEUTROPHILS NFR BLD AUTO: 8.23 10*3/MM3 (ref 1.7–7)
NRBC BLD AUTO-RTO: 0 /100 WBC (ref 0–0.2)
PLATELET # BLD AUTO: 218 10*3/MM3 (ref 140–450)
PMV BLD AUTO: 9.9 FL (ref 6–12)
RBC # BLD AUTO: 3.83 10*6/MM3 (ref 3.77–5.28)
RH BLD: POSITIVE
T PALLIDUM IGG SER QL: NORMAL
T&S EXPIRATION DATE: NORMAL
WBC NRBC COR # BLD AUTO: 10.74 10*3/MM3 (ref 3.4–10.8)

## 2024-03-27 PROCEDURE — 25010000002 MORPHINE PER 10 MG: Performed by: ANESTHESIOLOGY

## 2024-03-27 PROCEDURE — 25010000002 ONDANSETRON PER 1 MG: Performed by: ANESTHESIOLOGY

## 2024-03-27 PROCEDURE — 25010000002 BUPIVACAINE PF 0.75 % SOLUTION: Performed by: ANESTHESIOLOGY

## 2024-03-27 PROCEDURE — 25010000002 CEFAZOLIN PER 500 MG: Performed by: OBSTETRICS & GYNECOLOGY

## 2024-03-27 PROCEDURE — 86850 RBC ANTIBODY SCREEN: CPT | Performed by: OBSTETRICS & GYNECOLOGY

## 2024-03-27 PROCEDURE — 25010000002 MORPHINE PER 10 MG: Performed by: NURSE ANESTHETIST, CERTIFIED REGISTERED

## 2024-03-27 PROCEDURE — 59515 CESAREAN DELIVERY: CPT | Performed by: OBSTETRICS & GYNECOLOGY

## 2024-03-27 PROCEDURE — 25010000002 KETOROLAC TROMETHAMINE PER 15 MG: Performed by: OBSTETRICS & GYNECOLOGY

## 2024-03-27 PROCEDURE — 86920 COMPATIBILITY TEST SPIN: CPT

## 2024-03-27 PROCEDURE — 85025 COMPLETE CBC W/AUTO DIFF WBC: CPT | Performed by: OBSTETRICS & GYNECOLOGY

## 2024-03-27 PROCEDURE — 25810000003 LACTATED RINGERS PER 1000 ML: Performed by: OBSTETRICS & GYNECOLOGY

## 2024-03-27 PROCEDURE — 86901 BLOOD TYPING SEROLOGIC RH(D): CPT | Performed by: OBSTETRICS & GYNECOLOGY

## 2024-03-27 PROCEDURE — 59514 CESAREAN DELIVERY ONLY: CPT | Performed by: OBSTETRICS & GYNECOLOGY

## 2024-03-27 PROCEDURE — 25010000002 FENTANYL CITRATE (PF) 50 MCG/ML SOLUTION: Performed by: ANESTHESIOLOGY

## 2024-03-27 PROCEDURE — 25810000003 LACTATED RINGERS SOLUTION: Performed by: OBSTETRICS & GYNECOLOGY

## 2024-03-27 PROCEDURE — 86900 BLOOD TYPING SEROLOGIC ABO: CPT | Performed by: OBSTETRICS & GYNECOLOGY

## 2024-03-27 PROCEDURE — 86780 TREPONEMA PALLIDUM: CPT | Performed by: OBSTETRICS & GYNECOLOGY

## 2024-03-27 DEVICE — ABSORBABLE HEMOSTAT (OXIDIZED REGENERATED CELLULOSE)
Type: IMPLANTABLE DEVICE | Site: UTERUS | Status: FUNCTIONAL
Brand: SURGICEL

## 2024-03-27 RX ORDER — SIMETHICONE 80 MG
80 TABLET,CHEWABLE ORAL 4 TIMES DAILY PRN
Status: DISCONTINUED | OUTPATIENT
Start: 2024-03-27 | End: 2024-03-29 | Stop reason: HOSPADM

## 2024-03-27 RX ORDER — DROPERIDOL 2.5 MG/ML
0.62 INJECTION, SOLUTION INTRAMUSCULAR; INTRAVENOUS
Status: DISCONTINUED | OUTPATIENT
Start: 2024-03-27 | End: 2024-03-29 | Stop reason: HOSPADM

## 2024-03-27 RX ORDER — SODIUM CHLORIDE 0.9 % (FLUSH) 0.9 %
10 SYRINGE (ML) INJECTION AS NEEDED
Status: DISCONTINUED | OUTPATIENT
Start: 2024-03-27 | End: 2024-03-27 | Stop reason: HOSPADM

## 2024-03-27 RX ORDER — KETOROLAC TROMETHAMINE 30 MG/ML
30 INJECTION, SOLUTION INTRAMUSCULAR; INTRAVENOUS ONCE
Status: COMPLETED | OUTPATIENT
Start: 2024-03-27 | End: 2024-03-27

## 2024-03-27 RX ORDER — OMEPRAZOLE 20 MG/1
20 CAPSULE, DELAYED RELEASE ORAL DAILY
COMMUNITY

## 2024-03-27 RX ORDER — TEMAZEPAM 7.5 MG/1
7.5 CAPSULE ORAL NIGHTLY PRN
Status: DISCONTINUED | OUTPATIENT
Start: 2024-03-27 | End: 2024-03-29 | Stop reason: HOSPADM

## 2024-03-27 RX ORDER — ONDANSETRON 2 MG/ML
4 INJECTION INTRAMUSCULAR; INTRAVENOUS EVERY 6 HOURS PRN
Status: DISCONTINUED | OUTPATIENT
Start: 2024-03-27 | End: 2024-03-29 | Stop reason: HOSPADM

## 2024-03-27 RX ORDER — OXYCODONE HYDROCHLORIDE 5 MG/1
5 TABLET ORAL EVERY 4 HOURS PRN
Status: DISCONTINUED | OUTPATIENT
Start: 2024-03-27 | End: 2024-03-29 | Stop reason: HOSPADM

## 2024-03-27 RX ORDER — ONDANSETRON 4 MG/1
4 TABLET, ORALLY DISINTEGRATING ORAL EVERY 8 HOURS PRN
Status: DISCONTINUED | OUTPATIENT
Start: 2024-03-27 | End: 2024-03-29 | Stop reason: HOSPADM

## 2024-03-27 RX ORDER — MORPHINE SULFATE 4 MG/ML
INJECTION, SOLUTION INTRAMUSCULAR; INTRAVENOUS
Status: COMPLETED | OUTPATIENT
Start: 2024-03-27 | End: 2024-03-27

## 2024-03-27 RX ORDER — CITRIC ACID/SODIUM CITRATE 334-500MG
30 SOLUTION, ORAL ORAL ONCE
Status: DISCONTINUED | OUTPATIENT
Start: 2024-03-27 | End: 2024-03-27 | Stop reason: HOSPADM

## 2024-03-27 RX ORDER — HYDROXYZINE 50 MG/1
50 TABLET, FILM COATED ORAL EVERY 6 HOURS PRN
Status: DISCONTINUED | OUTPATIENT
Start: 2024-03-27 | End: 2024-03-29 | Stop reason: HOSPADM

## 2024-03-27 RX ORDER — ONDANSETRON 2 MG/ML
4 INJECTION INTRAMUSCULAR; INTRAVENOUS ONCE AS NEEDED
Status: DISCONTINUED | OUTPATIENT
Start: 2024-03-27 | End: 2024-03-29 | Stop reason: HOSPADM

## 2024-03-27 RX ORDER — DIPHENHYDRAMINE HYDROCHLORIDE 50 MG/ML
25 INJECTION INTRAMUSCULAR; INTRAVENOUS EVERY 4 HOURS PRN
Status: DISCONTINUED | OUTPATIENT
Start: 2024-03-27 | End: 2024-03-29 | Stop reason: HOSPADM

## 2024-03-27 RX ORDER — ALUMINA, MAGNESIA, AND SIMETHICONE 2400; 2400; 240 MG/30ML; MG/30ML; MG/30ML
15 SUSPENSION ORAL EVERY 4 HOURS PRN
Status: DISCONTINUED | OUTPATIENT
Start: 2024-03-27 | End: 2024-03-29 | Stop reason: HOSPADM

## 2024-03-27 RX ORDER — BUPIVACAINE HYDROCHLORIDE 7.5 MG/ML
INJECTION, SOLUTION EPIDURAL; RETROBULBAR
Status: COMPLETED | OUTPATIENT
Start: 2024-03-27 | End: 2024-03-27

## 2024-03-27 RX ORDER — ERYTHROMYCIN 5 MG/G
OINTMENT OPHTHALMIC
Status: ACTIVE
Start: 2024-03-27 | End: 2024-03-27

## 2024-03-27 RX ORDER — FAMOTIDINE 10 MG/ML
20 INJECTION, SOLUTION INTRAVENOUS ONCE AS NEEDED
Status: COMPLETED | OUTPATIENT
Start: 2024-03-27 | End: 2024-03-27

## 2024-03-27 RX ORDER — OXYTOCIN/0.9 % SODIUM CHLORIDE 30/500 ML
999 PLASTIC BAG, INJECTION (ML) INTRAVENOUS ONCE
Status: COMPLETED | OUTPATIENT
Start: 2024-03-27 | End: 2024-03-27

## 2024-03-27 RX ORDER — PHYTONADIONE 1 MG/.5ML
INJECTION, EMULSION INTRAMUSCULAR; INTRAVENOUS; SUBCUTANEOUS
Status: ACTIVE
Start: 2024-03-27 | End: 2024-03-27

## 2024-03-27 RX ORDER — SODIUM CHLORIDE, SODIUM LACTATE, POTASSIUM CHLORIDE, CALCIUM CHLORIDE 600; 310; 30; 20 MG/100ML; MG/100ML; MG/100ML; MG/100ML
125 INJECTION, SOLUTION INTRAVENOUS CONTINUOUS
Status: DISCONTINUED | OUTPATIENT
Start: 2024-03-27 | End: 2024-03-27

## 2024-03-27 RX ORDER — FAMOTIDINE 10 MG/ML
20 INJECTION, SOLUTION INTRAVENOUS ONCE AS NEEDED
Status: DISCONTINUED | OUTPATIENT
Start: 2024-03-27 | End: 2024-03-27 | Stop reason: HOSPADM

## 2024-03-27 RX ORDER — OXYTOCIN/0.9 % SODIUM CHLORIDE 30/500 ML
125 PLASTIC BAG, INJECTION (ML) INTRAVENOUS ONCE AS NEEDED
Status: COMPLETED | OUTPATIENT
Start: 2024-03-27 | End: 2024-03-27

## 2024-03-27 RX ORDER — OXYTOCIN/0.9 % SODIUM CHLORIDE 30/500 ML
250 PLASTIC BAG, INJECTION (ML) INTRAVENOUS CONTINUOUS
Status: DISPENSED | OUTPATIENT
Start: 2024-03-27 | End: 2024-03-27

## 2024-03-27 RX ORDER — MORPHINE SULFATE 2 MG/ML
2 INJECTION, SOLUTION INTRAMUSCULAR; INTRAVENOUS
Status: ACTIVE | OUTPATIENT
Start: 2024-03-27 | End: 2024-03-27

## 2024-03-27 RX ORDER — ONDANSETRON 2 MG/ML
4 INJECTION INTRAMUSCULAR; INTRAVENOUS ONCE AS NEEDED
Status: COMPLETED | OUTPATIENT
Start: 2024-03-27 | End: 2024-03-27

## 2024-03-27 RX ORDER — ENOXAPARIN SODIUM 100 MG/ML
40 INJECTION SUBCUTANEOUS EVERY 12 HOURS
Status: DISCONTINUED | OUTPATIENT
Start: 2024-03-28 | End: 2024-03-29 | Stop reason: HOSPADM

## 2024-03-27 RX ORDER — IBUPROFEN 600 MG/1
600 TABLET ORAL EVERY 6 HOURS
Status: DISCONTINUED | OUTPATIENT
Start: 2024-03-28 | End: 2024-03-29 | Stop reason: HOSPADM

## 2024-03-27 RX ORDER — CITRIC ACID/SODIUM CITRATE 334-500MG
30 SOLUTION, ORAL ORAL ONCE
Status: COMPLETED | OUTPATIENT
Start: 2024-03-27 | End: 2024-03-27

## 2024-03-27 RX ORDER — CALCIUM CARBONATE 500 MG/1
1 TABLET, CHEWABLE ORAL EVERY 4 HOURS PRN
Status: DISCONTINUED | OUTPATIENT
Start: 2024-03-27 | End: 2024-03-29 | Stop reason: HOSPADM

## 2024-03-27 RX ORDER — TRANEXAMIC ACID 10 MG/ML
1000 INJECTION, SOLUTION INTRAVENOUS ONCE AS NEEDED
Status: DISCONTINUED | OUTPATIENT
Start: 2024-03-27 | End: 2024-03-27 | Stop reason: HOSPADM

## 2024-03-27 RX ORDER — MORPHINE SULFATE 1 MG/ML
INJECTION, SOLUTION EPIDURAL; INTRATHECAL; INTRAVENOUS AS NEEDED
Status: DISCONTINUED | OUTPATIENT
Start: 2024-03-27 | End: 2024-03-27 | Stop reason: SURG

## 2024-03-27 RX ORDER — PRENATAL VIT/IRON FUM/FOLIC AC 27MG-0.8MG
1 TABLET ORAL DAILY
Status: DISCONTINUED | OUTPATIENT
Start: 2024-03-27 | End: 2024-03-29 | Stop reason: HOSPADM

## 2024-03-27 RX ORDER — CARBOPROST TROMETHAMINE 250 UG/ML
250 INJECTION, SOLUTION INTRAMUSCULAR
Status: DISCONTINUED | OUTPATIENT
Start: 2024-03-27 | End: 2024-03-27 | Stop reason: HOSPADM

## 2024-03-27 RX ORDER — ACETAMINOPHEN 500 MG
1000 TABLET ORAL ONCE
Status: COMPLETED | OUTPATIENT
Start: 2024-03-27 | End: 2024-03-27

## 2024-03-27 RX ORDER — HYDROMORPHONE HYDROCHLORIDE 1 MG/ML
0.5 INJECTION, SOLUTION INTRAMUSCULAR; INTRAVENOUS; SUBCUTANEOUS
Status: DISCONTINUED | OUTPATIENT
Start: 2024-03-27 | End: 2024-03-27 | Stop reason: HOSPADM

## 2024-03-27 RX ORDER — FENTANYL CITRATE 50 UG/ML
INJECTION, SOLUTION INTRAMUSCULAR; INTRAVENOUS
Status: COMPLETED | OUTPATIENT
Start: 2024-03-27 | End: 2024-03-27

## 2024-03-27 RX ORDER — OXYCODONE HYDROCHLORIDE 10 MG/1
10 TABLET ORAL EVERY 4 HOURS PRN
Status: DISCONTINUED | OUTPATIENT
Start: 2024-03-27 | End: 2024-03-29 | Stop reason: HOSPADM

## 2024-03-27 RX ORDER — OXYTOCIN/0.9 % SODIUM CHLORIDE 30/500 ML
125 PLASTIC BAG, INJECTION (ML) INTRAVENOUS ONCE AS NEEDED
Status: DISCONTINUED | OUTPATIENT
Start: 2024-03-27 | End: 2024-03-29 | Stop reason: HOSPADM

## 2024-03-27 RX ORDER — MISOPROSTOL 200 UG/1
800 TABLET ORAL ONCE AS NEEDED
Status: DISCONTINUED | OUTPATIENT
Start: 2024-03-27 | End: 2024-03-27 | Stop reason: HOSPADM

## 2024-03-27 RX ORDER — ACETAMINOPHEN 325 MG/1
650 TABLET ORAL EVERY 6 HOURS
Status: DISCONTINUED | OUTPATIENT
Start: 2024-03-28 | End: 2024-03-29 | Stop reason: HOSPADM

## 2024-03-27 RX ORDER — DIPHENHYDRAMINE HCL 25 MG
25 CAPSULE ORAL EVERY 4 HOURS PRN
Status: DISCONTINUED | OUTPATIENT
Start: 2024-03-27 | End: 2024-03-29 | Stop reason: HOSPADM

## 2024-03-27 RX ORDER — ACETAMINOPHEN 500 MG
1000 TABLET ORAL EVERY 6 HOURS
Status: COMPLETED | OUTPATIENT
Start: 2024-03-27 | End: 2024-03-28

## 2024-03-27 RX ORDER — HYDROCORTISONE 25 MG/G
CREAM TOPICAL 3 TIMES DAILY PRN
Status: DISCONTINUED | OUTPATIENT
Start: 2024-03-27 | End: 2024-03-29 | Stop reason: HOSPADM

## 2024-03-27 RX ORDER — SODIUM CHLORIDE 9 MG/ML
40 INJECTION, SOLUTION INTRAVENOUS AS NEEDED
Status: DISCONTINUED | OUTPATIENT
Start: 2024-03-27 | End: 2024-03-27 | Stop reason: HOSPADM

## 2024-03-27 RX ORDER — NALOXONE HCL 0.4 MG/ML
0.2 VIAL (ML) INJECTION
Status: DISCONTINUED | OUTPATIENT
Start: 2024-03-27 | End: 2024-03-29 | Stop reason: HOSPADM

## 2024-03-27 RX ORDER — KETOROLAC TROMETHAMINE 15 MG/ML
15 INJECTION, SOLUTION INTRAMUSCULAR; INTRAVENOUS EVERY 6 HOURS
Status: DISPENSED | OUTPATIENT
Start: 2024-03-27 | End: 2024-03-28

## 2024-03-27 RX ORDER — METHYLERGONOVINE MALEATE 0.2 MG/ML
200 INJECTION INTRAVENOUS ONCE AS NEEDED
Status: DISCONTINUED | OUTPATIENT
Start: 2024-03-27 | End: 2024-03-27 | Stop reason: HOSPADM

## 2024-03-27 RX ADMIN — ONDANSETRON 4 MG: 2 INJECTION INTRAMUSCULAR; INTRAVENOUS at 09:23

## 2024-03-27 RX ADMIN — MORPHINE SULFATE 200 MCG: 4 INJECTION, SOLUTION INTRAMUSCULAR; INTRAVENOUS at 10:50

## 2024-03-27 RX ADMIN — BUPIVACAINE HYDROCHLORIDE 1.6 ML: 7.5 INJECTION, SOLUTION EPIDURAL; RETROBULBAR at 10:50

## 2024-03-27 RX ADMIN — MORPHINE SULFATE 2 MG: 1 INJECTION, SOLUTION EPIDURAL; INTRATHECAL; INTRAVENOUS at 11:17

## 2024-03-27 RX ADMIN — SODIUM CHLORIDE, POTASSIUM CHLORIDE, SODIUM LACTATE AND CALCIUM CHLORIDE 125 ML/HR: 600; 310; 30; 20 INJECTION, SOLUTION INTRAVENOUS at 08:38

## 2024-03-27 RX ADMIN — ACETAMINOPHEN 1000 MG: 500 TABLET ORAL at 09:23

## 2024-03-27 RX ADMIN — KETOROLAC TROMETHAMINE 30 MG: 30 INJECTION, SOLUTION INTRAMUSCULAR at 11:34

## 2024-03-27 RX ADMIN — ACETAMINOPHEN 1000 MG: 500 TABLET ORAL at 23:11

## 2024-03-27 RX ADMIN — SODIUM CHLORIDE 2000 MG: 900 INJECTION INTRAVENOUS at 10:06

## 2024-03-27 RX ADMIN — KETOROLAC TROMETHAMINE 15 MG: 15 INJECTION, SOLUTION INTRAMUSCULAR; INTRAVENOUS at 19:47

## 2024-03-27 RX ADMIN — FENTANYL CITRATE 20 MCG: 50 INJECTION, SOLUTION INTRAMUSCULAR; INTRAVENOUS at 10:50

## 2024-03-27 RX ADMIN — Medication 999 ML/HR: at 11:20

## 2024-03-27 RX ADMIN — FAMOTIDINE 20 MG: 10 INJECTION INTRAVENOUS at 09:23

## 2024-03-27 RX ADMIN — ACETAMINOPHEN 1000 MG: 500 TABLET ORAL at 17:43

## 2024-03-27 RX ADMIN — SODIUM CITRATE AND CITRIC ACID MONOHYDRATE 30 ML: 334; 500 SOLUTION ORAL at 10:05

## 2024-03-27 RX ADMIN — MORPHINE SULFATE 2 MG: 1 INJECTION, SOLUTION EPIDURAL; INTRATHECAL; INTRAVENOUS at 11:19

## 2024-03-27 RX ADMIN — Medication 125 ML/HR: at 12:59

## 2024-03-27 RX ADMIN — SODIUM CHLORIDE, POTASSIUM CHLORIDE, SODIUM LACTATE AND CALCIUM CHLORIDE 1000 ML: 600; 310; 30; 20 INJECTION, SOLUTION INTRAVENOUS at 09:29

## 2024-03-27 NOTE — H&P
Patient is a 29 y.o. female  admitted at 39+ weeks for elective repeat .      Patient Active Problem List   Diagnosis    Abnormal uterine bleeding (AUB)    Obesity (BMI 30-39.9)    Amenorrhea    Tobacco abuse    Encounter for counseling regarding contraception    Pregnancy     Prenatal care is complicated by maternal tobacco use, Covid during pregnancy and previous .    Past Medical History:   Diagnosis Date    Anxiety     Depression     Gestational hypertension        Past Surgical History:   Procedure Laterality Date     SECTION         Allergies   Allergen Reactions    Contrast Dye (Echo Or Unknown Ct/Mr) Hives     CT dye        Social History     Socioeconomic History    Marital status: Single    Number of children: 2   Tobacco Use    Smoking status: Every Day     Current packs/day: 0.50     Types: Cigarettes    Smokeless tobacco: Never   Vaping Use    Vaping status: Every Day    Substances: Nicotine, Flavoring    Devices: Pre-filled or refillable cartridge   Substance and Sexual Activity    Alcohol use: Never    Drug use: Never    Sexual activity: Yes     Partners: Male     Birth control/protection: None       Physical Exam  Gen: Alert and pleasant.  Vitals:    24 0822   BP: 120/73   Pulse: 92   Resp: 18   Temp: 98.5 °F (36.9 °C)     HEENT: WNL  Abdomen: Gravid  FHT: Category 1    Assessment/Plan: IUP at 39 weeks  - Patient for elective repeat .  She planned to attempt trial of labor if active labor occurred spontaneously; however, this did not occur.  Pelvis also did not appear clinically adequate.    Jacob Colin MD

## 2024-03-27 NOTE — LACTATION NOTE
This note was copied from a baby's chart.  P2, T baby-new admission. Mom BF her 1-st child for 3 months. Informed PT that LC is available to help with BF until 2100. Offer assistance, but mom declined, said she will call with next feeding if needing help. Reports baby BF well so far. Encouraged her to try BF baby every 2-3 h. or PRN.  Mom already ordered PBP. She denies any questions. Encouraged to call if needing help.

## 2024-03-27 NOTE — ANESTHESIA PREPROCEDURE EVALUATION
Anesthesia Evaluation     NPO Solid Status: > 8 hours  NPO Liquid Status: > 8 hours           Airway   Mallampati: I  No difficulty expected  Dental      Pulmonary    Cardiovascular   Exercise tolerance: good (4-7 METS)    (+) hypertension      Neuro/Psych  (+) psychiatric history  GI/Hepatic/Renal/Endo    (+) morbid obesity    Musculoskeletal     Abdominal    Substance History      OB/GYN    (+) Pregnant, pregnancy induced hypertension        Other                    Anesthesia Plan    ASA 3     spinal       Anesthetic plan, risks, benefits, and alternatives have been provided, discussed and informed consent has been obtained with: patient.    CODE STATUS:    Level Of Support Discussed With: Patient  Code Status (Patient has no pulse and is not breathing): CPR (Attempt to Resuscitate)  Medical Interventions (Patient has pulse or is breathing): Full Support

## 2024-03-27 NOTE — OP NOTE
Operative Note    Procedure: Repeat low transverse  delivery    Surgeon: Jacob Colin MD    Assistant: Chago Diop MD who assisted with development of surgical planes, hemostasis, delivery of infant and closure of incisions.    Preop diagnosis:  1)  Intrauterine Pregnancy at 39+ weeks.  2)  Maternal Tobacco Use.  3)  Covid in Pregnancy.    Postop diagnosis: Same    Indications: Patient is a 29 year old  at 39+ weeks who presents for elective repeat  and declines trial of labor.    Findings: Live viable female infant, Apgar 8 at one minute and 9 at five minutes, Weight 7lb 12oz    Cord gases: Not performed    Anesthesia: Spinal    ESTIMATED BLOOD LOSS:  491 cc    SPECIMENS: * No orders in the log *    Pathology: None    Complications: None    Procedure: Patient was taken to operating room. After adequate spinal anesthesia was administered/dosed, patient was placed on OR table in dorsal supine position with a left tilt. Abdomen was prepped and draped in a normal, sterile fashion. Patient identified with two identifiers and timeout performed with all team members and patient agreeing to the planned procedure.  It was confirmed that the patient had received Kefzol 2 grams prior to the start of the incision.    A Pfannenstiel incision made with the knife and taken through the subcutaneous tissue to the fascia with the knife. The fascia scored in the midline and the incision was extended laterally with curved Camara scissors. The superior rectus fascia was grasped with Kocher clamps times two and divided off the underlying rectus muscles. This was repeated on the inferior aspect. The rectus muscles were then  in the midline and the parietal peritoneum was entered digitally.  Patient had loose omental adhesions to the anterior uterus and bladder; these were dissected off with the electrocautery divide.  The incision was extended bluntly and the bladder blade inserted. The vesicouterine  peritoneum was tented upward and incision with curved Metzenbaum scissors and the incision was extended laterally. The bladder flap was then created digitally and the bladder blade replaced.    A low transverse uterine incision was made with the knife and extended laterally with finger fraction. The head was found to be cephalic and was delivered through the uterine incision. The oropharynx and nares were bulb-suctioned, the cord was clamped times two and cut, and the infant handed to the awaiting pediatricians. The infant was immediately vigorous. Cord blood was then collected. The placenta delivered spontaneously intact.  A three vessel cord was noted.    The uterus was delivered onto the maternal abdomen and wiped clean of clots and debris. The uterine incision was then closed in two layers of 0 Monocryl suture, the first layer in a running locked fashion and the second layer imbricating the first. Good hemostasis was noted. The uterine incision was inspected and noted to be hemostatic.  The posterior cul de sac was inspected, irrigated with saline, cleared of all clots/debris and the uterus was returned to the abdomen. The pelvic cavity was wiped clean of clots. The uterine incision was again inspected and found to be hemostatic.  The subfascial space was inspected and noted to be hemostatic.  The fascia was closed with 0 vicryl in a running fascia. The subcutaneous tissue was irrigated and made hemostatic with the Bovie and 5 interrupted sutures were placed with 0 Monocryl. The skin was closed with 4-0 monocryl in a subcuticular fashion. Dermabond and a sterile dressing was applied.    Counts for needles, sponges, laps and instruments were correct times two at the end of the procedure. I was present and scrubbed for the entire procedure. There were no major complications. The patient was transported to the recovery area in stable condition. Mother and baby were bonding well at the end of the  procedure.    Disposition:  To PACU      Jacob Colin MD

## 2024-03-27 NOTE — ANESTHESIA POSTPROCEDURE EVALUATION
Patient: Cammy Zaldivar    Procedure Summary       Date: 24 Room / Location:  [facility]    Anesthesia Start: 1031 Anesthesia Stop: 1212    Procedure:  SECTION REPEAT (Abdomen) Diagnosis:       ([phone])      ()      ()      ( estevan will bring assistant)    Surgeons: Jacob Colin MD Provider: Kota Majano MD    Anesthesia Type: spinal ASA Status: 3            Anesthesia Type: spinal    Vitals  Vitals Value Taken Time   /69 24 1415   Temp 35.6 °C (96 °F) 24 1415   Pulse 65 24 1415   Resp 16 24 1415   SpO2 97 % 24 1344   Vitals shown include unfiled device data.        Post Anesthesia Care and Evaluation    Patient location during evaluation: PACU  Patient participation: complete - patient participated  Level of consciousness: awake  Pain management: adequate    Airway patency: patent  Anesthetic complications: No anesthetic complications  PONV Status: none  Cardiovascular status: acceptable  Respiratory status: acceptable  Hydration status: acceptable

## 2024-03-27 NOTE — ANESTHESIA PROCEDURE NOTES
Spinal Block      Patient reassessed immediately prior to procedure    Patient location during procedure: pre-op  Stop Time: 3/27/2024 10:50 AM  Indication:at surgeon's request  Performed By  Anesthesiologist: Kota Majano MD  Preanesthetic Checklist  Completed: patient identified, IV checked, site marked, risks and benefits discussed, surgical consent, monitors and equipment checked, pre-op evaluation and timeout performed  Spinal Block Prep:  Patient Position:sitting  Sterile Tech:cap, gloves, sterile barriers and mask  Prep:Chloraprep  Patient Monitoring:EKG, continuous pulse oximetry and blood pressure monitoring    Spinal Block Procedure  Approach:midline  Guidance:landmark technique  Location:L3-L4  Needle Type:Pencan  Needle Gauge:25 G  Placement of Spinal needle event:cerebrospinal fluid aspirated  Paresthesia: no  Fluid Appearance:clear  Medications: fentaNYL citrate (PF) (SUBLIMAZE) injection - Intrathecal   20 mcg - 3/27/2024 10:50:00 AM  Morphine sulfate (PF) injection - Spinal   200 mcg - 3/27/2024 10:50:00 AM  bupivacaine PF (MARCAINE) 0.75 % injection - Spinal   1.6 mL - 3/27/2024 10:50:00 AM   Post Assessment  Patient Tolerance:patient tolerated the procedure well with no apparent complications  Complications no

## 2024-03-28 ENCOUNTER — PATIENT OUTREACH (OUTPATIENT)
Dept: LABOR AND DELIVERY | Facility: HOSPITAL | Age: 29
End: 2024-03-28
Payer: COMMERCIAL

## 2024-03-28 LAB
BASOPHILS # BLD AUTO: 0.03 10*3/MM3 (ref 0–0.2)
BASOPHILS NFR BLD AUTO: 0.3 % (ref 0–1.5)
DEPRECATED RDW RBC AUTO: 40.2 FL (ref 37–54)
EOSINOPHIL # BLD AUTO: 0.14 10*3/MM3 (ref 0–0.4)
EOSINOPHIL NFR BLD AUTO: 1.4 % (ref 0.3–6.2)
ERYTHROCYTE [DISTWIDTH] IN BLOOD BY AUTOMATED COUNT: 12.8 % (ref 12.3–15.4)
HCT VFR BLD AUTO: 27.6 % (ref 34–46.6)
HGB BLD-MCNC: 9.1 G/DL (ref 12–15.9)
IMM GRANULOCYTES # BLD AUTO: 0.04 10*3/MM3 (ref 0–0.05)
IMM GRANULOCYTES NFR BLD AUTO: 0.4 % (ref 0–0.5)
LYMPHOCYTES # BLD AUTO: 1.84 10*3/MM3 (ref 0.7–3.1)
LYMPHOCYTES NFR BLD AUTO: 17.9 % (ref 19.6–45.3)
MCH RBC QN AUTO: 28.7 PG (ref 26.6–33)
MCHC RBC AUTO-ENTMCNC: 33 G/DL (ref 31.5–35.7)
MCV RBC AUTO: 87.1 FL (ref 79–97)
MONOCYTES # BLD AUTO: 0.75 10*3/MM3 (ref 0.1–0.9)
MONOCYTES NFR BLD AUTO: 7.3 % (ref 5–12)
NEUTROPHILS NFR BLD AUTO: 7.46 10*3/MM3 (ref 1.7–7)
NEUTROPHILS NFR BLD AUTO: 72.7 % (ref 42.7–76)
NRBC BLD AUTO-RTO: 0 /100 WBC (ref 0–0.2)
PLATELET # BLD AUTO: 179 10*3/MM3 (ref 140–450)
PMV BLD AUTO: 9.9 FL (ref 6–12)
RBC # BLD AUTO: 3.17 10*6/MM3 (ref 3.77–5.28)
WBC NRBC COR # BLD AUTO: 10.26 10*3/MM3 (ref 3.4–10.8)

## 2024-03-28 PROCEDURE — 86900 BLOOD TYPING SEROLOGIC ABO: CPT

## 2024-03-28 PROCEDURE — 25010000002 ENOXAPARIN PER 10 MG: Performed by: OBSTETRICS & GYNECOLOGY

## 2024-03-28 PROCEDURE — 25010000002 KETOROLAC TROMETHAMINE PER 15 MG: Performed by: OBSTETRICS & GYNECOLOGY

## 2024-03-28 PROCEDURE — 0503F POSTPARTUM CARE VISIT: CPT

## 2024-03-28 PROCEDURE — 86901 BLOOD TYPING SEROLOGIC RH(D): CPT

## 2024-03-28 PROCEDURE — 85025 COMPLETE CBC W/AUTO DIFF WBC: CPT | Performed by: OBSTETRICS & GYNECOLOGY

## 2024-03-28 RX ORDER — BACITRACIN ZINC AND POLYMYXIN B SULFATE 500; 10000 [USP'U]/G; [USP'U]/G
1 OINTMENT TOPICAL EVERY 12 HOURS SCHEDULED
Status: DISCONTINUED | OUTPATIENT
Start: 2024-03-28 | End: 2024-03-28 | Stop reason: CLARIF

## 2024-03-28 RX ORDER — FERROUS SULFATE 325(65) MG
325 TABLET ORAL
Status: DISCONTINUED | OUTPATIENT
Start: 2024-03-28 | End: 2024-03-29 | Stop reason: HOSPADM

## 2024-03-28 RX ORDER — IBUPROFEN 200 MG
1 TABLET ORAL EVERY 12 HOURS SCHEDULED
Status: DISCONTINUED | OUTPATIENT
Start: 2024-03-28 | End: 2024-03-29 | Stop reason: HOSPADM

## 2024-03-28 RX ADMIN — ACETAMINOPHEN 325MG 650 MG: 325 TABLET ORAL at 20:14

## 2024-03-28 RX ADMIN — ACETAMINOPHEN 1000 MG: 500 TABLET ORAL at 06:12

## 2024-03-28 RX ADMIN — ACETAMINOPHEN 1000 MG: 500 TABLET ORAL at 11:55

## 2024-03-28 RX ADMIN — FERROUS SULFATE TAB 325 MG (65 MG ELEMENTAL FE) 325 MG: 325 (65 FE) TAB at 11:31

## 2024-03-28 RX ADMIN — IBUPROFEN 600 MG: 600 TABLET ORAL at 20:14

## 2024-03-28 RX ADMIN — Medication 1 TABLET: at 11:31

## 2024-03-28 RX ADMIN — KETOROLAC TROMETHAMINE 15 MG: 15 INJECTION, SOLUTION INTRAMUSCULAR; INTRAVENOUS at 02:06

## 2024-03-28 RX ADMIN — ENOXAPARIN SODIUM 40 MG: 100 INJECTION SUBCUTANEOUS at 11:56

## 2024-03-28 RX ADMIN — IBUPROFEN 600 MG: 600 TABLET ORAL at 14:10

## 2024-03-28 RX ADMIN — BACITRACIN ZINC NEOMYCIN SULFATE POLYMYXIN B SULFATE 1 APPLICATION: 400; 3.5; 5 OINTMENT TOPICAL at 15:57

## 2024-03-28 RX ADMIN — KETOROLAC TROMETHAMINE 15 MG: 15 INJECTION, SOLUTION INTRAMUSCULAR; INTRAVENOUS at 08:55

## 2024-03-28 NOTE — PLAN OF CARE
Goal Outcome Evaluation:  Plan of Care Reviewed With: patient, spouse        Progress: improving  Outcome Evaluation: vss. ambulating independently. pain controlled with eras meds. taylor in place. bonding well with infant.

## 2024-03-28 NOTE — OUTREACH NOTE
[facility]  IP Postpartum    Questions/Answers      Flowsheet Row Responses   Best Method for Contacting Cell   Support Person Present Yes   Does the patient have a car seat at the hospital Yes   Delivery Note Reviewed Reviewed   Were birth expectations met? Yes   Is there a need for additional support/resources? No   Lactation Note Reviewed Reviewed   Is additional support needed? No   Any questions or concerns? No   Is the patient going to use Meds to Beds? Yes   Any concerns related discharge meds/ability to  prescriptions? No   Confirm Postpartum OB appointment --  [reviewed]   Confirm initial well-child Pediatrician appointment date/time: --  [reviewed]   Does patient have transportation to appointments? Yes   Any other assistance needed to ensure she is able to attend appointments? No   Does patient have supplies needed at home for  care? Breast Pump, Clothing, Crib, Diapers, Formula          Pt doing well and happy with delivery. She is breastfeeding and has all necessary infant items. Reviewed f/u appts. F/u call in 1-2 weeks.     Inga Vanessa RN  Maternity Nurse Navigator    3/28/2024, 10:24 EDT

## 2024-03-28 NOTE — PROGRESS NOTES
PROGRESS NOTE:   POSTOP DAY 1      PATIENT: Cammy Zaldivar        MR#:[id number]  LOCATION: [facility]  DATE OF ADMISSION: 3/27/2024  ADMISSION  DIAGNOSIS:   Previous  section    Pregnancy     CURRENT DIAGNOSIS: No notes have been filed under this hospital service.  Service: Hospitalist      SUBJECTIVE     29 y.o. yo Female  status post  section at 39w4d doing well. Pain well controlled . Lochia appropriate.       Previous  section    Pregnancy        OBJECTIVE    Vitals  Temp:  Temp:  [96 °F (35.6 °C)-98.3 °F (36.8 °C)] 98.2 °F (36.8 °C)  Temp src: Oral  BP:  BP: (100-138)/(56-77) 120/77  Pulse:  Heart Rate:  [53-91] 74  RR:   Resp:  [16-18] 16    General:  Awake, alert, no acute distress   Cardiac: Regular rate and rhythm    Respiratory: Lungs clear bilaterally, normal respiratory effort    Abdomen: Soft, non-distended, fundus firm, below umbilicus, appropriately tender   Incision: Healing well, no dehiscence, no significant drainage, no erythema or exudate   Pelvis: deferred   Extremities: Calves NT bilaterally, DTR 2+, no clonus noted, trace edema     I/O last 3 completed shifts:  In: 1000 [I.V.:1000]  Out: 1391 [Urine:900; Blood:491]    Lab Results   Component Value Date    WBC 10.26 2024    HGB 9.1 (L) 2024    HCT 27.6 (L) 2024    MCV 87.1 2024     2024    CREATININE 0.63 2020    AST 17 2020    ALT 9 (L) 2020     Results from last 7 days   Lab Units 24  0836   ABO TYPING  A   RH TYPING  Positive   ANTIBODY SCREEN  Negative       INFANT: female       ASSESSMENT: Post operative day 1 from  section. Hgb: 9.1.    PLAN: Doing well. Add PO ferrous sulfate to daily medications for hgb 9.1. Continue routine supportive care. Discontinue IV, advance diet, may shower.    Ria Sigala CNM  3/28/2024   09:50 EDT

## 2024-03-28 NOTE — LACTATION NOTE
This note was copied from a baby's chart.  Mom reports baby is latching well so far.  Denied need for assistance at this time.   Educated on when to expect milk supply, frequency of feedings, and expected output.  LC number on whiteboard.  Encouraged to call for assist if needed.

## 2024-03-29 VITALS
HEIGHT: 63 IN | RESPIRATION RATE: 16 BRPM | TEMPERATURE: 98.1 F | WEIGHT: 242 LBS | OXYGEN SATURATION: 96 % | SYSTOLIC BLOOD PRESSURE: 129 MMHG | BODY MASS INDEX: 42.88 KG/M2 | DIASTOLIC BLOOD PRESSURE: 79 MMHG | HEART RATE: 86 BPM

## 2024-03-29 PROCEDURE — 90471 IMMUNIZATION ADMIN: CPT | Performed by: OBSTETRICS & GYNECOLOGY

## 2024-03-29 PROCEDURE — 25010000002 ENOXAPARIN PER 10 MG: Performed by: OBSTETRICS & GYNECOLOGY

## 2024-03-29 PROCEDURE — 25010000002 MEASLES, MUMPS & RUBELLA VAC RECONSTITUTED SOLUTION: Performed by: OBSTETRICS & GYNECOLOGY

## 2024-03-29 PROCEDURE — 90707 MMR VACCINE SC: CPT | Performed by: OBSTETRICS & GYNECOLOGY

## 2024-03-29 PROCEDURE — 0503F POSTPARTUM CARE VISIT: CPT | Performed by: NURSE PRACTITIONER

## 2024-03-29 RX ORDER — DOCUSATE SODIUM 100 MG/1
100 CAPSULE, LIQUID FILLED ORAL 2 TIMES DAILY
Qty: 60 CAPSULE | Refills: 1 | Status: SHIPPED | OUTPATIENT
Start: 2024-03-29

## 2024-03-29 RX ORDER — FERROUS SULFATE 325(65) MG
325 TABLET ORAL
Qty: 30 TABLET | Refills: 1 | Status: SHIPPED | OUTPATIENT
Start: 2024-03-29

## 2024-03-29 RX ORDER — IBUPROFEN 600 MG/1
600 TABLET ORAL EVERY 6 HOURS
Qty: 90 TABLET | Refills: 1 | Status: SHIPPED | OUTPATIENT
Start: 2024-03-29

## 2024-03-29 RX ADMIN — MEASLES, MUMPS, AND RUBELLA VIRUS VACCINE LIVE 0.5 ML: 1000; 12500; 1000 INJECTION, POWDER, LYOPHILIZED, FOR SUSPENSION SUBCUTANEOUS at 11:32

## 2024-03-29 RX ADMIN — OXYCODONE HYDROCHLORIDE 5 MG: 5 TABLET ORAL at 00:19

## 2024-03-29 RX ADMIN — IBUPROFEN 600 MG: 600 TABLET ORAL at 08:59

## 2024-03-29 RX ADMIN — BACITRACIN ZINC NEOMYCIN SULFATE POLYMYXIN B SULFATE 1 APPLICATION: 400; 3.5; 5 OINTMENT TOPICAL at 09:00

## 2024-03-29 RX ADMIN — IBUPROFEN 600 MG: 600 TABLET ORAL at 02:58

## 2024-03-29 RX ADMIN — OXYCODONE HYDROCHLORIDE 5 MG: 5 TABLET ORAL at 11:36

## 2024-03-29 RX ADMIN — ENOXAPARIN SODIUM 40 MG: 100 INJECTION SUBCUTANEOUS at 00:20

## 2024-03-29 RX ADMIN — Medication 1 TABLET: at 08:59

## 2024-03-29 RX ADMIN — ACETAMINOPHEN 325MG 650 MG: 325 TABLET ORAL at 05:59

## 2024-03-29 RX ADMIN — FERROUS SULFATE TAB 325 MG (65 MG ELEMENTAL FE) 325 MG: 325 (65 FE) TAB at 08:59

## 2024-03-29 NOTE — DISCHARGE SUMMARY
Date of Discharge:  3/29/2024    Discharge Diagnosis: s/p repeat  section    Presenting Problem/History of Present Illness  Pregnancy [Z34.90]     Hospital Course  Patient is a 29 y.o. female presented for scheduled elective repeat  section.  Her pregnancy was complicated by maternal tobacco use, covid during pregnancy, and previous .  She delivered a viable female infant weighing 7lb 11.9 oz with apgars of 8&9. For further information surrounding this procedure please seen operative note. Her postpartum course has been uncomplicated. She is voiding adequately, passing gas, tolerating a regular diet, incision is intact, and she is ambulating without difficulty or assistance. Her pain is well controlled. We have reviewed discharge instructions in detail.     Procedures Performed  Procedure(s):   SECTION REPEAT       Consults:   Consults       No orders found from 2024 to 3/28/2024.            Pertinent Test Results:   WBC   Date Value Ref Range Status   2024 10.26 3.40 - 10.80 10*3/mm3 Final     RBC   Date Value Ref Range Status   2024 3.17 (L) 3.77 - 5.28 10*6/mm3 Final     Hemoglobin   Date Value Ref Range Status   2024 9.1 (L) 12.0 - 15.9 g/dL Final     Hematocrit   Date Value Ref Range Status   2024 27.6 (L) 34.0 - 46.6 % Final     MCV   Date Value Ref Range Status   2024 87.1 79.0 - 97.0 fL Final     MCH   Date Value Ref Range Status   2024 28.7 26.6 - 33.0 pg Final     MCHC   Date Value Ref Range Status   2024 33.0 31.5 - 35.7 g/dL Final     RDW   Date Value Ref Range Status   2024 12.8 12.3 - 15.4 % Final     RDW-SD   Date Value Ref Range Status   2024 40.2 37.0 - 54.0 fl Final     MPV   Date Value Ref Range Status   2024 9.9 6.0 - 12.0 fL Final     Platelets   Date Value Ref Range Status   2024 179 140 - 450 10*3/mm3 Final     Neutrophil %   Date Value Ref Range Status   2024 72.7 42.7 - 76.0 %  Final     Lymphocyte %   Date Value Ref Range Status   03/28/2024 17.9 (L) 19.6 - 45.3 % Final     Monocyte %   Date Value Ref Range Status   03/28/2024 7.3 5.0 - 12.0 % Final     Eosinophil %   Date Value Ref Range Status   03/28/2024 1.4 0.3 - 6.2 % Final     Basophil %   Date Value Ref Range Status   03/28/2024 0.3 0.0 - 1.5 % Final     Immature Grans %   Date Value Ref Range Status   03/28/2024 0.4 0.0 - 0.5 % Final     Neutrophils, Absolute   Date Value Ref Range Status   03/28/2024 7.46 (H) 1.70 - 7.00 10*3/mm3 Final     Lymphocytes, Absolute   Date Value Ref Range Status   03/28/2024 1.84 0.70 - 3.10 10*3/mm3 Final     Monocytes, Absolute   Date Value Ref Range Status   03/28/2024 0.75 0.10 - 0.90 10*3/mm3 Final     Eosinophils, Absolute   Date Value Ref Range Status   03/28/2024 0.14 0.00 - 0.40 10*3/mm3 Final     Basophils, Absolute   Date Value Ref Range Status   03/28/2024 0.03 0.00 - 0.20 10*3/mm3 Final     Immature Grans, Absolute   Date Value Ref Range Status   03/28/2024 0.04 0.00 - 0.05 10*3/mm3 Final     nRBC   Date Value Ref Range Status   03/28/2024 0.0 0.0 - 0.2 /100 WBC Final       Condition on Discharge:  Stable    Vital Signs  Temp:  [97.9 °F (36.6 °C)-98.6 °F (37 °C)] 98.1 °F (36.7 °C)  Heart Rate:  [78-87] 86  Resp:  [16] 16  BP: (112-129)/(70-79) 129/79    Physical Exam:   See Progress Note    Discharge Disposition  Home or Self Care    Discharge Medications     Discharge Medications        New Medications        Instructions Start Date   docusate sodium 100 MG capsule  Commonly known as: Colace   100 mg, Oral, 2 Times Daily      ferrous sulfate 325 (65 FE) MG tablet   325 mg, Oral, Daily With Breakfast      ibuprofen 600 MG tablet  Commonly known as: ADVIL,MOTRIN   600 mg, Oral, Every 6 Hours             Continue These Medications        Instructions Start Date   omeprazole 20 MG capsule  Commonly known as: priLOSEC   20 mg, Oral, Daily      prenatal vitamin 27-0.8 27-0.8 MG tablet  tablet   Oral, Daily             Stop These Medications      amoxicillin 875 MG tablet  Commonly known as: AMOXIL              Discharge Diet: Regular    Activity at Discharge: Pelvic rest X6 weeks    Education: Warning signs and symptoms given, no tub baths, nothing in the vagina for 6 weeks, no driving for 2 weeks or while talking narcotics     Follow-up Appointments  No future appointments.  Additional Instructions for the Follow-ups that You Need to Schedule       Discharge Follow-up with Specified Provider: Cristi; 2 Weeks   As directed      To: Cristi   Follow Up: 2 Weeks   Follow Up Details: Incision check                Test Results Pending at Discharge       PRIYA Moreno  03/29/24  08:56 EDT    Time: 08:56 EDT

## 2024-03-29 NOTE — PAYOR COMM NOTE
"Adi Anderson (29 y.o. Female)       Date of Birth   1995    Social Security Number       Address   [address] [facility] [address]    Home Phone   [phone]    MRN   [id number]       Yazidi   None    Marital Status   Single                            Admission Date   3/27/24    Admission Type   Elective    Admitting Provider   Jacob Colin MD    Attending Provider   Jacob Colin MD    Department, Room/Bed   [address], E355/1       Discharge Date       Discharge Disposition   Home or Self Care    Discharge Destination                                 Attending Provider: Jacob Colin MD    Allergies: Contrast Dye (Echo Or Unknown Ct/mr)    Isolation: None   Infection: None   Code Status: CPR    Ht: 160 cm (63\")   Wt: 110 kg (242 lb)    Admission Cmt: None   Principal Problem: Previous  section [Z98.891]                   Active Insurance as of 3/27/2024       Primary Coverage       Payor Plan Insurance Group Employer/Plan Group    [facility] [place] [facility] BY [place] [id number]       Payor Plan Address Payor Plan Phone Number Payor Plan Fax Number Effective Dates    [address] [address]   2021 - None Entered    [facility] [id number]         Subscriber Name Subscriber Birth Date Member ID       ADI ANDERSON 1995 [id number]                     Emergency Contacts        (Rel.) Home Phone Work Phone Mobile Phone    [email or web address] (Significant Other) [phone] -- [phone]              Insurance Information                  [employer] BY [place]/[employer] BY [place] Phone: --    Subscriber: Adi Anderson Subscriber#: [id number]    Group#: [id number] Precert#: --          Problem List             Codes Noted - Resolved       Hospital    Pregnancy ICD-10-CM: Z34.90  ICD-9-CM: V22.2 3/27/2024 - Present    * (Principal) Previous  section ICD-10-CM: Z98.891  ICD-9-CM: V45.89 " 3/27/2024 - Present       Non-Hospital    Encounter for counseling regarding contraception ICD-10-CM: Z30.09  ICD-9-CM: V25.09 12/10/2021 - Present    Tobacco abuse (Chronic) ICD-10-CM: Z72.0  ICD-9-CM: 305.1 2021 - Present    Abnormal uterine bleeding (AUB) (Chronic) ICD-10-CM: N93.9  ICD-9-CM: 626.9 11/15/2021 - Present    Obesity (BMI 30-39.9) (Chronic) ICD-10-CM: E66.9  ICD-9-CM: 278.00 11/15/2021 - Present    Amenorrhea ICD-10-CM: N91.2  ICD-9-CM: 626.0 11/15/2021 - Present        History & Physical        Jacob Colin MD at 24 0916          Patient is a 29 y.o. female  admitted at 39+ weeks for elective repeat .      Patient Active Problem List   Diagnosis    Abnormal uterine bleeding (AUB)    Obesity (BMI 30-39.9)    Amenorrhea    Tobacco abuse    Encounter for counseling regarding contraception    Pregnancy     Prenatal care is complicated by maternal tobacco use, Covid during pregnancy and previous .    Past Medical History:   Diagnosis Date    Anxiety     Depression     Gestational hypertension        Past Surgical History:   Procedure Laterality Date     SECTION         Allergies   Allergen Reactions    Contrast Dye (Echo Or Unknown Ct/Mr) Hives     CT dye        Social History     Socioeconomic History    Marital status: Single    Number of children: 2   Tobacco Use    Smoking status: Every Day     Current packs/day: 0.50     Types: Cigarettes    Smokeless tobacco: Never   Vaping Use    Vaping status: Every Day    Substances: Nicotine, Flavoring    Devices: Pre-filled or refillable cartridge   Substance and Sexual Activity    Alcohol use: Never    Drug use: Never    Sexual activity: Yes     Partners: Male     Birth control/protection: None       Physical Exam  Gen: Alert and pleasant.  Vitals:    24 0822   BP: 120/73   Pulse: 92   Resp: 18   Temp: 98.5 °F (36.9 °C)     HEENT: WNL  Abdomen: Gravid  FHT: Category 1    Assessment/Plan: IUP at 39  weeks  - Patient for elective repeat .  She planned to attempt trial of labor if active labor occurred spontaneously; however, this did not occur.  Pelvis also did not appear clinically adequate.    Jacob Colin MD    Electronically signed by Jacob Colin MD at 24 0924       Facility-Administered Medications as of 3/29/2024   Medication Dose Route Frequency Provider Last Rate Last Admin    [COMPLETED] acetaminophen (TYLENOL) tablet 1,000 mg  1,000 mg Oral Once Jacob Colin MD   1,000 mg at 24 0923    [COMPLETED] acetaminophen (TYLENOL) tablet 1,000 mg  1,000 mg Oral Q6H Jacob Colin MD   1,000 mg at 24 1155    Followed by    acetaminophen (TYLENOL) tablet 650 mg  650 mg Oral Q6H Jacob Colin MD   650 mg at 24 0559    all purpose nipple ointment 1 Application  1 Application Topical 4x Daily PRN Jacob Colin MD        aluminum-magnesium hydroxide-simethicone (MAALOX MAX) 400-400-40 MG/5ML suspension 15 mL  15 mL Oral Q4H PRN Jacob Colin MD        Or    calcium carbonate (TUMS) chewable tablet 500 mg (200 mg elemental)  1 tablet Oral Q4H PRN Jacob Colin MD        [COMPLETED] ceFAZolin 2000 mg IVPB in 100 mL NS (MBP)  2,000 mg Intravenous Once Jacob Colin MD   2,000 mg at 24 1006    diphenhydrAMINE (BENADRYL) capsule 25 mg  25 mg Oral Q4H PRN Katrin Kong CRNA        Or    diphenhydrAMINE (BENADRYL) injection 25 mg  25 mg Intravenous Q4H PRN Katrin Kong CRNA        Or    diphenhydrAMINE (BENADRYL) injection 25 mg  25 mg Intramuscular Q4H PRN Katrin Kong CRNA        droperidol (INAPSINE) injection 0.625 mg  0.625 mg Intravenous Q20 Min PRN Katrin Kong CRNA        Or    droperidol (INAPSINE) injection 0.625 mg  0.625 mg Intramuscular Q20 Min PRN Katrin Kong CRNA        Enoxaparin Sodium (LOVENOX) syringe 40 mg  40 mg Subcutaneous Q12H Jacob Colin  MD Haseeb   40 mg at 24 0020    [] erythromycin (ROMYCIN) 5 MG/GM ophthalmic ointment  - ADS Override Pull             [COMPLETED] famotidine (PEPCID) injection 20 mg  20 mg Intravenous Once PRN Jacob Colin MD   20 mg at 24 0923    ferrous sulfate tablet 325 mg  325 mg Oral Daily With Breakfast Ria Sigala CNM   325 mg at 24 0859    Hydrocortisone (Perianal) (ANUSOL-HC) 2.5 % rectal cream   Rectal TID PRN Jacob Colin MD        hydrOXYzine (ATARAX) tablet 50 mg  50 mg Oral Q6H PRN Jacob Colin MD        [] ketorolac (TORADOL) injection 15 mg  15 mg Intravenous Q6H Jacob Colin MD   15 mg at 24 0855    Followed by    ibuprofen (ADVIL,MOTRIN) tablet 600 mg  600 mg Oral Q6H Jacob Colin MD   600 mg at 24 0859    [COMPLETED] ketorolac (TORADOL) injection 30 mg  30 mg Intravenous Once Jacob Colin MD   30 mg at 24 1134    [COMPLETED] lactated ringers bolus 1,000 mL  1,000 mL Intravenous Once Jacob Colin MD 1,000 mL/hr at 24 0929 1,000 mL at 24 0929    lanolin topical 1 Application  1 Application Topical Q1H PRN Jacob Colin MD        Measles, Mumps & Rubella Vac (MMR) injection 0.5 mL  0.5 mL Subcutaneous During Hospitalization Deepak Perez MD        [] morphine injection 2 mg  2 mg Intravenous Q30 Min PRN Katrin Kong CRNA        naloxone (NARCAN) injection 0.2 mg  0.2 mg Intravenous Q15 Min PRN Katrin Kong CRNA        neomycin-bacitracin-polymyxin (NEOSPORIN) ointment 1 Application  1 Application Topical Q12H Ria Sigala CNM   1 Application at 24 0900    [COMPLETED] ondansetron (ZOFRAN) injection 4 mg  4 mg Intravenous Once PRN Kota Majano MD   4 mg at 24 0923    ondansetron (ZOFRAN) injection 4 mg  4 mg Intravenous Once PRN Katrin Kong CRNA        ondansetron (ZOFRAN) injection 4 mg  4 mg  Intravenous Q6H PRN Jacob Colin MD        ondansetron ODT (ZOFRAN-ODT) disintegrating tablet 4 mg  4 mg Oral Q8H PRN Jacob Colin MD        oxyCODONE (ROXICODONE) immediate release tablet 5 mg  5 mg Oral Q4H PRN Jacob Colin MD   5 mg at 24 0019    Or    oxyCODONE (ROXICODONE) immediate release tablet 10 mg  10 mg Oral Q4H PRN Jacob Colin MD        [COMPLETED] oxytocin (PITOCIN) 30 units in 0.9% sodium chloride 500 mL (premix)  999 mL/hr Intravenous Once Jacob Colin MD   Stopped at 24 1259    Followed by    [] oxytocin (PITOCIN) 30 units in 0.9% sodium chloride 500 mL (premix)  250 mL/hr Intravenous Continuous Jacob Colin MD        [COMPLETED] oxytocin (PITOCIN) 30 units in 0.9% sodium chloride 500 mL (premix)  125 mL/hr Intravenous Once PRN Jacob Colin  mL/hr at 24 1259 125 mL/hr at 24 1259    oxytocin (PITOCIN) 30 units in 0.9% sodium chloride 500 mL (premix)  125 mL/hr Intravenous Once PRN Jacob Colin MD        [] phytonadione (VITAMIN K) 1 MG/0.5ML injection  - ADS Override Pull             prenatal vitamin tablet 1 tablet  1 tablet Oral Daily Jacob Colin MD   1 tablet at 24 0859    simethicone (MYLICON) chewable tablet 80 mg  80 mg Oral 4x Daily PRN Jacob Colin MD        [COMPLETED] Sod Citrate-Citric Acid (BICITRA) oral solution 30 mL  30 mL Oral Once Jacob Colin MD   30 mL at 24 1005    temazepam (RESTORIL) capsule 7.5 mg  7.5 mg Oral Nightly PRN Jacob Colin MD         Lab Results (last 72 hours)       Procedure Component Value Units Date/Time    CBC & Differential [[id number]]  (Abnormal) Collected: 24    Specimen: Blood Updated: 24 06    Narrative:      The following orders were created for panel order CBC & Differential.  Procedure                               Abnormality          Status                     ---------                               -----------         ------                     CBC Auto Differential[[id number]]        Abnormal            Final result                 Please view results for these tests on the individual orders.    CBC Auto Differential [[id number]]  (Abnormal) Collected: 03/28/24 0539    Specimen: Blood Updated: 03/28/24 0600     WBC 10.26 10*3/mm3      RBC 3.17 10*6/mm3      Hemoglobin 9.1 g/dL      Hematocrit 27.6 %      MCV 87.1 fL      MCH 28.7 pg      MCHC 33.0 g/dL      RDW 12.8 %      RDW-SD 40.2 fl      MPV 9.9 fL      Platelets 179 10*3/mm3      Neutrophil % 72.7 %      Lymphocyte % 17.9 %      Monocyte % 7.3 %      Eosinophil % 1.4 %      Basophil % 0.3 %      Immature Grans % 0.4 %      Neutrophils, Absolute 7.46 10*3/mm3      Lymphocytes, Absolute 1.84 10*3/mm3      Monocytes, Absolute 0.75 10*3/mm3      Eosinophils, Absolute 0.14 10*3/mm3      Basophils, Absolute 0.03 10*3/mm3      Immature Grans, Absolute 0.04 10*3/mm3      nRBC 0.0 /100 WBC     T Pallidum Antibody w/ reflex RPR (Syphilis) [[id number]]  (Normal) Collected: 03/27/24 0836    Specimen: Blood Updated: 03/27/24 0914     Treponemal AB Total Non-Reactive    CBC & Differential [[id number]]  (Abnormal) Collected: 03/27/24 0836    Specimen: Blood Updated: 03/27/24 0848    Narrative:      The following orders were created for panel order CBC & Differential.  Procedure                               Abnormality         Status                     ---------                               -----------         ------                     CBC Auto Differential[[id number]]        Abnormal            Final result                 Please view results for these tests on the individual orders.    CBC Auto Differential [[id number]]  (Abnormal) Collected: 03/27/24 0836    Specimen: Blood Updated: 03/27/24 0848     WBC 10.74 10*3/mm3      RBC 3.83 10*6/mm3      Hemoglobin 11.4 g/dL      Hematocrit 34.4 %      MCV  "89.8 fL      MCH 29.8 pg      MCHC 33.1 g/dL      RDW 13.2 %      RDW-SD 43.2 fl      MPV 9.9 fL      Platelets 218 10*3/mm3      Neutrophil % 76.6 %      Lymphocyte % 15.9 %      Monocyte % 6.1 %      Eosinophil % 0.7 %      Basophil % 0.2 %      Immature Grans % 0.5 %      Neutrophils, Absolute 8.23 10*3/mm3      Lymphocytes, Absolute 1.71 10*3/mm3      Monocytes, Absolute 0.66 10*3/mm3      Eosinophils, Absolute 0.07 10*3/mm3      Basophils, Absolute 0.02 10*3/mm3      Immature Grans, Absolute 0.05 10*3/mm3      nRBC 0.0 /100 WBC           Imaging Results (Last 72 Hours)       ** No results found for the last 72 hours. **          ECG/EMG Results (last 72 hours)       ** No results found for the last 72 hours. **          Orders (last 72 hrs)        Start     Ordered    03/29/24 0855  Discharge patient  Once         03/29/24 0855    03/29/24 0855  Discontinue IV  Once         03/29/24 0855    03/29/24 0000  ferrous sulfate 325 (65 FE) MG tablet  Daily With Breakfast         03/29/24 0855    03/29/24 0000  ibuprofen (ADVIL,MOTRIN) 600 MG tablet  Every 6 Hours         03/29/24 0855    03/29/24 0000  docusate sodium (Colace) 100 MG capsule  2 Times Daily         03/29/24 0855    03/29/24 0000  Discharge Follow-up with Specified Provider: Cristi; 2 Weeks         03/29/24 0855    03/29/24 0000  Activity as Tolerated         03/29/24 0855    03/29/24 0000  Pelvic Rest         03/29/24 0855    03/29/24 0000  Driving Restrictions         03/29/24 0855    03/29/24 0000  Diet: Regular/House Diet; Thin (IDDSI 0)         03/29/24 0855    03/28/24 1745  acetaminophen (TYLENOL) tablet 650 mg  Every 6 Hours        Placed in \"Followed by\" Linked Group    03/27/24 1231    03/28/24 1502  Activity Order  Once         03/28/24 1501    03/28/24 1500  Measles, Mumps & Rubella Vac (MMR) injection 0.5 mL  During Hospitalization         03/28/24 1501    03/28/24 1330  ibuprofen (ADVIL,MOTRIN) tablet 600 mg  Every 6 Hours        Placed in " "\"Followed by\" Linked Group    03/27/24 1231    03/28/24 1230  Enoxaparin Sodium (LOVENOX) syringe 40 mg  Every 12 Hours         03/27/24 1231    03/28/24 1100  neomycin-bacitracin-polymyxin (NEOSPORIN) ointment 1 Application  Every 12 Hours Scheduled         03/28/24 0931    03/28/24 1045  ferrous sulfate tablet 325 mg  Daily With Breakfast         03/28/24 0950    03/28/24 1015  bacitracin-polymyxin b (POLYSPORIN) ointment 1 Application  Every 12 Hours Scheduled,   Status:  Discontinued         03/28/24 0927    03/28/24 0600  Discontinue Indwelling Urinary Catheter in AM  Once         03/27/24 1231    03/28/24 0600  CBC & Differential  Morning Draw         03/27/24 1231    03/28/24 0600  CBC Auto Differential  PROCEDURE ONCE         03/27/24 2202    03/28/24 0000  Remove Abdominal Dressing  Once         03/27/24 1231    03/27/24 1800  Ambulate Patient  2 Times Daily      Comments: After anesthesia wears off.    03/27/24 1231    03/27/24 1726  Diet: Regular/House; Fluid Consistency: Thin (IDDSI 0)  Diet Effective Now         03/27/24 1725    03/27/24 1423  Notify Provider (Specified)  Until Discontinued         03/27/24 1422    03/27/24 1423  Vital Signs Per Hospital Policy  Per Hospital Policy         03/27/24 1422    03/27/24 1423  Strict Bed Rest  Until Discontinued         03/27/24 1422    03/27/24 1423  Fundal & Lochia Check  Per Order Details        Comments: Every 15 Minutes x4, Then Every 30 Minutes x2, Then Every Shift    03/27/24 1422    03/27/24 1423  Fundal & Lochia Check  Every Shift       03/27/24 1422    03/27/24 1423  NPO Diet NPO Type: Ice Chips  Diet Effective Now,   Status:  Canceled        Comments: May advance diet as tolerated.    03/27/24 1422    03/27/24 1423  Advance Diet As Tolerated -  Until Discontinued,   Status:  Canceled         03/27/24 1422    03/27/24 1423  Transfer to postpartum when discharge criteria met.  Until Discontinued         03/27/24 1422    03/27/24 1423  Vital Signs  " "Per Hospital Policy         03/27/24 1422    03/27/24 1423  IV Site Care  Continuous         03/27/24 1422    03/27/24 1423  Oxygen Therapy- Nasal Cannula; 2 LPM  Continuous         03/27/24 1422    03/27/24 1423  Continuous Pulse Oximetry  Continuous         03/27/24 1422    03/27/24 1423  Respirations  Continuous        Comments: If respiratory rate is less than 10/min, notify the Anesthesiologist    03/27/24 1422    03/27/24 1423  If respiratory is less than 8/min, see Narcan order below. Notify Anesthesiologist STAT.  Until Discontinued         03/27/24 1422    03/27/24 1423  Blood Pressure and Pulse Every 4 Hours  Continuous         03/27/24 1422    03/27/24 1423  Activity & Removal of Castellano Catheter, Per Obstetrician  Continuous         03/27/24 1422    03/27/24 1423  Notify Anesthesiologist for Any Questions / Problems  Continuous         03/27/24 1422    03/27/24 1423  Notify Anesthesia for Temp Over 101.4F  Continuous         03/27/24 1422    03/27/24 1423  Ambu Bag at Bedside  Continuous         03/27/24 1422    03/27/24 1422  naloxone (NARCAN) injection 0.2 mg  Every 15 Minutes PRN         03/27/24 1422    03/27/24 1422  ondansetron (ZOFRAN) injection 4 mg  Once As Needed         03/27/24 1422    03/27/24 1422  droperidol (INAPSINE) injection 0.625 mg  Every 20 Minutes PRN        Placed in \"Or\" Linked Group    03/27/24 1422    03/27/24 1422  droperidol (INAPSINE) injection 0.625 mg  Every 20 Minutes PRN        Placed in \"Or\" Linked Group    03/27/24 1422    03/27/24 1422  diphenhydrAMINE (BENADRYL) capsule 25 mg  Every 4 Hours PRN        Placed in \"Or\" Linked Group    03/27/24 1422    03/27/24 1422  diphenhydrAMINE (BENADRYL) injection 25 mg  Every 4 Hours PRN        Placed in \"Or\" Linked Group    03/27/24 1422    03/27/24 1422  diphenhydrAMINE (BENADRYL) injection 25 mg  Every 4 Hours PRN        Placed in \"Or\" Linked Group    03/27/24 1422    03/27/24 1422  morphine injection 2 mg  Every 30 Minutes PRN   " "      03/27/24 1422    03/27/24 1330  prenatal vitamin tablet 1 tablet  Daily         03/27/24 1231    03/27/24 1315  acetaminophen (TYLENOL) tablet 1,000 mg  Every 6 Hours        Placed in \"Followed by\" Linked Group    03/27/24 1231    03/27/24 1315  ketorolac (TORADOL) injection 15 mg  Every 6 Hours        Placed in \"Followed by\" Linked Group    03/27/24 1231    03/27/24 1300  Incentive spirometry RT  Every Hour       03/27/24 1231    03/27/24 1231  HYDROmorphone (DILAUDID) injection 0.5 mg  Every 10 Minutes PRN,   Status:  Discontinued         03/27/24 1231    03/27/24 1230  Hydrocortisone (Perianal) (ANUSOL-HC) 2.5 % rectal cream  3 Times Daily PRN         03/27/24 1231    03/27/24 1230  temazepam (RESTORIL) capsule 7.5 mg  Nightly PRN         03/27/24 1231    03/27/24 1230  aluminum-magnesium hydroxide-simethicone (MAALOX MAX) 400-400-40 MG/5ML suspension 15 mL  Every 4 Hours PRN        Placed in \"Or\" Linked Group    03/27/24 1231    03/27/24 1230  calcium carbonate (TUMS) chewable tablet 500 mg (200 mg elemental)  Every 4 Hours PRN        Placed in \"Or\" Linked Group    03/27/24 1231    03/27/24 1230  lanolin topical 1 Application  Every 1 Hour PRN         03/27/24 1231    03/27/24 1230  all purpose nipple ointment 1 Application  4 Times Daily PRN         03/27/24 1231    03/27/24 1230  hydrOXYzine (ATARAX) tablet 50 mg  Every 6 Hours PRN         03/27/24 1231    03/27/24 1230  ondansetron (ZOFRAN) injection 4 mg  Every 6 Hours PRN         03/27/24 1231    03/27/24 1230  Transfer Patient  Once         03/27/24 1231    03/27/24 1230  Code Status and Medical Interventions:  Continuous         03/27/24 1231    03/27/24 1230  Vital Signs Per hospital policy  Per Hospital Policy         03/27/24 1231    03/27/24 1230  Notify Physician  Until Discontinued         03/27/24 1231    03/27/24 1230  Patient May Shower  Once        Comments: After anesthesia wears off and with assistance    03/27/24 1231    03/27/24 1230  " "Diet: Regular/House; Fluid Consistency: Thin (IDDSI 0)  Diet Effective Now,   Status:  Canceled         03/27/24 1231    03/27/24 1230  Advance Diet As Tolerated -Regular  Until Discontinued,   Status:  Canceled         03/27/24 1231    03/27/24 1230  I/O  Every Shift       03/27/24 1231    03/27/24 1230  Fundal and Lochia Check  Per Hospital Policy        Comments: Q 30 min x 2, Q 1 hr x 4, Q 4 hrs x 24 hrs, then Q shift.    03/27/24 1231    03/27/24 1230  Weigh Patient  Once         03/27/24 1231    03/27/24 1230  Straight Catheterize  Once        Comments: 1) May straight catheterize one time, PRN for inablility to void. 2) If necessary to catheterize a second time, insert indwelling urinary catheter and leave in if residual is greater than 150ml.    03/27/24 1231    03/27/24 1230  Continue Indwelling Urinary Catheter Already in Place  Once         03/27/24 1231    03/27/24 1230  Notify Provider if Bladder Distention Continues  Until Discontinued         03/27/24 1231    03/27/24 1230  Urinary Catheter Care  Every Shift,   Status:  Canceled       03/27/24 1231    03/27/24 1230  Turn Cough Deep Breathe  Once         03/27/24 1231    03/27/24 1230  Encourage early intake of PO fluids  Continuous         03/27/24 1231    03/27/24 1230  Ambulate Patient 3-5 times per day (with or without Castellano)  Every Shift       03/27/24 1231    03/27/24 1230  Apply Abdominal Binding Until Discontinued  Until Discontinued         03/27/24 1231    03/27/24 1230  \"If patient tolerates food and liquids after completion of second bag of Pitocin, saline lock IV and discontinue IV fluid infusions.  Once         03/27/24 1231    03/27/24 1230  Breast pump to bed  Once         03/27/24 1231    03/27/24 1230  If indicated -- Please administer RH Immunoglobulin based on results of cord blood evaluation and fetal screen lab tests, pharmacy to dispense  Per Order Details        Comments: See Process Instructions For Reference Range Details.    " "03/27/24 1231    03/27/24 1230  Place Sequential Compression Device  Once         03/27/24 1231    03/27/24 1230  Maintain Sequential Compression Device  Continuous         03/27/24 1231    03/27/24 1230  ondansetron ODT (ZOFRAN-ODT) disintegrating tablet 4 mg  Every 8 Hours PRN         03/27/24 1231    03/27/24 1229  oxyCODONE (ROXICODONE) immediate release tablet 5 mg  Every 4 Hours PRN        Placed in \"Or\" Linked Group    03/27/24 1231    03/27/24 1229  oxyCODONE (ROXICODONE) immediate release tablet 10 mg  Every 4 Hours PRN        Placed in \"Or\" Linked Group    03/27/24 1231    03/27/24 1229  simethicone (MYLICON) chewable tablet 80 mg  4 Times Daily PRN         03/27/24 1231    03/27/24 1229  oxytocin (PITOCIN) 30 units in 0.9% sodium chloride 500 mL (premix)  Once As Needed         03/27/24 1231    03/27/24 1219  oxytocin (PITOCIN) 30 units in 0.9% sodium chloride 500 mL (premix)  Once As Needed         03/27/24 1219    03/27/24 1130  ketorolac (TORADOL) injection 30 mg  Once         03/27/24 1040    03/27/24 1040  methylergonovine (METHERGINE) injection 200 mcg  Once As Needed,   Status:  Discontinued         03/27/24 1040    03/27/24 1040  carboprost (HEMABATE) injection 250 mcg  Every 15 Minutes PRN,   Status:  Discontinued         03/27/24 1040    03/27/24 1040  miSOPROStol (CYTOTEC) tablet 800 mcg  Once As Needed,   Status:  Discontinued         03/27/24 1040    03/27/24 1040  tranexamic acid 1000 mg in 100 mL 0.7% NaCl infusion (premix)  Once As Needed,   Status:  Discontinued         03/27/24 1040    03/27/24 1001  phytonadione (VITAMIN K) 1 MG/0.5ML injection  - ADS Override Pull        Note to Pharmacy: Created by cabinet override    03/27/24 1001    03/27/24 1001  erythromycin (ROMYCIN) 5 MG/GM ophthalmic ointment  - ADS Override Pull        Note to Pharmacy: Created by cabinet override    03/27/24 1001    03/27/24 0970  Sod Citrate-Citric Acid (BICITRA) oral solution 30 mL  Once,   Status:  " "Discontinued         03/27/24 0841    03/27/24 0915  oxytocin (PITOCIN) 30 units in 0.9% sodium chloride 500 mL (premix)  Continuous        Placed in \"Followed by\" Linked Group    03/27/24 0803    03/27/24 0900  lactated ringers bolus 1,000 mL  Once         03/27/24 0802    03/27/24 0900  lactated ringers infusion  Continuous,   Status:  Discontinued         03/27/24 0802    03/27/24 0900  acetaminophen (TYLENOL) tablet 1,000 mg  Once         03/27/24 0802    03/27/24 0900  incisional cocktail 6 - Ropivacaine 0.5% (50mL), Clonidine HCl 80mcg/0.8 mL,  Epinephrine 1:1000 (0.3mL), N/S 0.9% (50mL) solution 100 mL  Once,   Status:  Discontinued         03/27/24 0802 03/27/24 0900  Sod Citrate-Citric Acid (BICITRA) oral solution 30 mL  Once         03/27/24 0802    03/27/24 0900  ceFAZolin 2000 mg IVPB in 100 mL NS (MBP)  Once         03/27/24 0804    03/27/24 0857  Verify Informed Consent for Blood Product Administration  Once,   Status:  Canceled         03/27/24 0857    03/27/24 0857  Prepare RBC, 2 Units  Blood - Once         03/27/24 0857    03/27/24 0845  oxytocin (PITOCIN) 30 units in 0.9% sodium chloride 500 mL (premix)  Once        Placed in \"Followed by\" Linked Group    03/27/24 0803    03/27/24 0842  Nurse may remove epidural catheter after delivery.  Until Discontinued,   Status:  Canceled         03/27/24 0841    03/27/24 0842  Notify physician for the following conditions:  Until Discontinued,   Status:  Canceled         03/27/24 0841    03/27/24 0841  ondansetron (ZOFRAN) injection 4 mg  Once As Needed         03/27/24 0841    03/27/24 0841  famotidine (PEPCID) injection 20 mg  Once As Needed,   Status:  Discontinued         03/27/24 0841    03/27/24 0802  famotidine (PEPCID) injection 20 mg  Once As Needed         03/27/24 0802    03/27/24 0800  Admit To Obstetrics Inpatient  Once         03/27/24 0802    03/27/24 0800  Code Status and Medical Interventions:  Continuous,   Status:  Canceled         " 24 0802    24  Obtain Informed Consent  Once,   Status:  Canceled         2402    24  Vital Signs q 4 while awake  Every 4 Hours,   Status:  Canceled      Comments: While the patient is awake.    24 0802    24 08  Vital Signs Per Hospital Policy  Per Hospital Policy,   Status:  Canceled         24 0802    24  Continuous Fetal Monitoring With NST on Admission and Prior to Initiation of Oxytocin.  Per Order Details,   Status:  Canceled        Comments: Continuous Fetal Monitoring With NST on Admission    24 0802    24  External Uterine Contraction Monitoring  Per Hospital Policy,   Status:  Canceled         2402    24  Notify Provider (Specified)  Until Discontinued,   Status:  Canceled         24  Notify Provider of Tachysystole (Per Hospital Algorithm)  Until Discontinued,   Status:  Canceled         2402    24 08  Notify Provider if Membranes Ruptured, Bleeding Greater Than 1 Pad Per Hour, Fetal Heart Tone Abnormality or Severe Pain  Until Discontinued,   Status:  Canceled         2402    24 08  Initiate Group Beta Strep (GBS) Prophylaxis Protocol, If Criteria Met  Continuous,   Status:  Canceled        Comments: NO TREATMENT RECOMMENDED IF: 1) Maternal GBS Status Known Negative 2) Scheduled  Birth With Intact Membranes, Not in Labor 3) Maternal GBS Status Unknown, No Risk Factors  TREAT WITH ANTIBIOTICS IF:  1) Maternal GBS Status Known Positive 2) Maternal GBS Status Unknown With Risk Factors: a)  Previous Infant Affected By GBS Infection b) GBS Urinary Tract Infection (UTI) or Bacteriuria During Pregnancy c) Unexplained Maternal Fever (100.4F (38C) or Greater) During Labor d)  Prolonged Rupture of Membranes (18 or More Hours) e) Gestational Age Less Than 37 Weeks    2402    24  Insert Indwelling Urinary Catheter  Once,    "Status:  Canceled        Comments: Follow Protocol As Outlined in Process Instructions (Open Order Report to View Full Instructions)   Placed in \"And\" Linked Group    03/27/24 0802    03/27/24 0800  Assess Need for Indwelling Urinary Catheter - Follow Removal Protocol  Continuous,   Status:  Canceled        Comments: Follow Protocol As Outlined in Process Instructions (Open Order Report to View Full Instructions)   Placed in \"And\" Linked Group    03/27/24 0802    03/27/24 0800  Urinary Catheter Care  Every Shift,   Status:  Canceled      Placed in \"And\" Linked Group    03/27/24 0802    03/27/24 0800  Abdominal Prep with Clippers  Once,   Status:  Canceled         03/27/24 0802    03/27/24 0800  Chlorhexadine Skin Prep Unless Otherwise Indicated  Once,   Status:  Canceled         03/27/24 0802    03/27/24 0800  SCD (sequential compression devices)  Once,   Status:  Canceled         03/27/24 0802    03/27/24 0800  Document Gatorade Consumption Prior to Admission (Yes or No)  Once,   Status:  Canceled         03/27/24 0802    03/27/24 0800  NPO Diet NPO Type: Strict NPO, Sips with Meds  Diet Effective Now,   Status:  Canceled         03/27/24 0802    03/27/24 0800  Inpatient Consult to Anesthesiology  Once,   Status:  Canceled        Specialty:  Anesthesiology  Provider:  (Not yet assigned)    03/27/24 0802    03/27/24 0800  Type & Screen  Once         03/27/24 0802    03/27/24 0800  T Pallidum Antibody w/ reflex RPR (Syphilis)  Once         03/27/24 0802    03/27/24 0800  CBC & Differential  STAT         03/27/24 0802    03/27/24 0800  Insert Peripheral IV  Once,   Status:  Canceled         03/27/24 0802    03/27/24 0800  Saline Lock & Maintain IV Access  Continuous,   Status:  Canceled         03/27/24 0802    03/27/24 0800  CBC Auto Differential  PROCEDURE ONCE         03/27/24 0803    03/27/24 0759  sodium chloride 0.9 % flush 10 mL  As Needed,   Status:  Discontinued         03/27/24 0802    03/27/24 0759  sodium " chloride 0.9 % infusion 40 mL  As Needed,   Status:  Discontinued         24 0802    Unscheduled  Blood Gas, Arterial -  As Needed       24 1422    Unscheduled  Up with Assistance  As Needed       24 1231    Unscheduled  Bladder Scan if Patient Unable to Void 4-6 Hours After Catheter Removal  As Needed         24 1231    Unscheduled  Straight Cath Every 4-6 Hours As Needed If Patient is Unable to Void After 4-6 Hours, Bladder Scan Volume is Greater Than 500mL & Patient Has Symptoms of Bladder Discomfort / Distention  As Needed       24 1231    Unscheduled  Schedule / Prompt Voiding For Patients With Urinary Incontinence  As Needed       24 1231    Unscheduled  Wound Care  As Needed      Comments: Postop day 1. Remove dressing and leave incision open to air.    24 1231    Unscheduled  KPad  As Needed      Comments: PRN pain    24 1231    Unscheduled  Chewing Gum  As Needed       24 1231    Unscheduled  Apply witch hazel pads / TUCKS to perineum as needed for comfort PRN  As Needed       24 1231    Unscheduled  Warm compress  As Needed       24 1231    Unscheduled  Apply ace wrap, tight bra, or binder  As Needed       24 1231    Unscheduled  Apply ice packs  As Needed       24 1231    --  omeprazole (priLOSEC) 20 MG capsule  Daily         24 0826                     Operative/Procedure Notes (last 72 hours)        Jacob Colin MD at 24 1113          Operative Note    Procedure: Repeat low transverse  delivery    Surgeon: Jacob Colin MD    Assistant: Chago Diop MD who assisted with development of surgical planes, hemostasis, delivery of infant and closure of incisions.    Preop diagnosis:  1)  Intrauterine Pregnancy at 39+ weeks.  2)  Maternal Tobacco Use.  3)  Covid in Pregnancy.    Postop diagnosis: Same    Indications: Patient is a 29 year old  at 39+ weeks who presents for elective repeat   and declines trial of labor.    Findings: Live viable female infant, Apgar 8 at one minute and 9 at five minutes, Weight 7lb 12oz    Cord gases: Not performed    Anesthesia: Spinal    ESTIMATED BLOOD LOSS:  491 cc    SPECIMENS: * No orders in the log *    Pathology: None    Complications: None    Procedure: Patient was taken to operating room. After adequate spinal anesthesia was administered/dosed, patient was placed on OR table in dorsal supine position with a left tilt. Abdomen was prepped and draped in a normal, sterile fashion. Patient identified with two identifiers and timeout performed with all team members and patient agreeing to the planned procedure.  It was confirmed that the patient had received Kefzol 2 grams prior to the start of the incision.    A Pfannenstiel incision made with the knife and taken through the subcutaneous tissue to the fascia with the knife. The fascia scored in the midline and the incision was extended laterally with curved Camara scissors. The superior rectus fascia was grasped with Kocher clamps times two and divided off the underlying rectus muscles. This was repeated on the inferior aspect. The rectus muscles were then  in the midline and the parietal peritoneum was entered digitally.  Patient had loose omental adhesions to the anterior uterus and bladder; these were dissected off with the electrocautery divide.  The incision was extended bluntly and the bladder blade inserted. The vesicouterine peritoneum was tented upward and incision with curved Metzenbaum scissors and the incision was extended laterally. The bladder flap was then created digitally and the bladder blade replaced.    A low transverse uterine incision was made with the knife and extended laterally with finger fraction. The head was found to be cephalic and was delivered through the uterine incision. The oropharynx and nares were bulb-suctioned, the cord was clamped times two and cut, and the  infant handed to the awaiting pediatricians. The infant was immediately vigorous. Cord blood was then collected. The placenta delivered spontaneously intact.  A three vessel cord was noted.    The uterus was delivered onto the maternal abdomen and wiped clean of clots and debris. The uterine incision was then closed in two layers of 0 Monocryl suture, the first layer in a running locked fashion and the second layer imbricating the first. Good hemostasis was noted. The uterine incision was inspected and noted to be hemostatic.  The posterior cul de sac was inspected, irrigated with saline, cleared of all clots/debris and the uterus was returned to the abdomen. The pelvic cavity was wiped clean of clots. The uterine incision was again inspected and found to be hemostatic.  The subfascial space was inspected and noted to be hemostatic.  The fascia was closed with 0 vicryl in a running fascia. The subcutaneous tissue was irrigated and made hemostatic with the Bovie and 5 interrupted sutures were placed with 0 Monocryl. The skin was closed with 4-0 monocryl in a subcuticular fashion. Dermabond and a sterile dressing was applied.    Counts for needles, sponges, laps and instruments were correct times two at the end of the procedure. I was present and scrubbed for the entire procedure. There were no major complications. The patient was transported to the recovery area in stable condition. Mother and baby were bonding well at the end of the procedure.    Disposition:  To PACU      Jacob Colin MD     Electronically signed by Jacob Colin MD at 24 1225          Physician Progress Notes (last 72 hours)        Laura Weaver, PRIYA at 24 0804       Attestation signed by Remy Lawson MD at 24 1008    I have reviewed this documentation and agree.                   Section Progress Note    Assessment & Plan     Status post  section: Doing well postoperatively.   Continue  current care. Desires discharge home today. We reviewed discharge instructions in detail    2.  DVT Prophylaxis: BMI 42.87.  SCD's while at rest. Encouraged ambulation. On Lovenox.     3. History of postpartum depression and anxiety: Mood is stable at this time. No current medications    4. Anemia: Secondary to delivery loss. Hgb 11.4-->9.1.  cc. Asymptomatic. Continue oral iron supplement    Rh status: A+  Syphilis screen in hospital: non-reactive  Rubella: Not immune-MMR ordered  Gender: female    Subjective     Postpartum Day 2:  Delivery    The patient feels well. The patient denies emotional concerns. Pain is well controlled with current medications. The baby is well.Urinary output is adequate. The patient is ambulating well. The patient is tolerating a normal diet. Patient reports passing flatus.    Objective     Vital signs in last 24 hours:  Temp:  [97.9 °F (36.6 °C)-98.6 °F (37 °C)] 98.1 °F (36.7 °C)  Heart Rate:  [78-87] 86  Resp:  [16] 16  BP: (112-129)/(70-79) 129/79    General:    alert, appears stated age, and cooperative   CV: RRR, no m/r/g   Lungs: CTAB, no wheezes, no respiratory distress   Bowel Sounds:  active   Lochia:  appropriate   Uterine Fundus:   firm   Incision:  healing well, no significant drainage, no dehiscence, no significant erythema, mild ecchymosis, small abrasions from adhesive tape are healing well   DVT Evaluation:  No evidence of DVT seen on physical exam.     Lab Results   Component Value Date    WBC 10.26 2024    HGB 9.1 (L) 2024    HCT 27.6 (L) 2024    MCV 87.1 2024     2024         Laura Weaver, APRN  3/29/2024  08:51 EDT       Electronically signed by Remy Lawson MD at 24 1008       Ria Sigala CNM at 24 0997       Attestation signed by Deepak Perez MD at 24 1501    I have reviewed this documentation and agree.    POD #1 after Repeat  - scheduled   BMI > 40 - is on VTE PPx  with SCD/Lovenox per protocol   Anemia postpartum - Hg normal at 11.4 g pre delivery down to 9.1 g this AM. So operative loss   Syphilis screen negative.   MMR prior to discharge for Non-immune     Deepak Perez MD  3/28/2024  15:00 EDT                   PROGRESS NOTE:   POSTOP DAY 1      PATIENT: Cammy Zaldivar        MR#:[id number]  LOCATION: [facility]  DATE OF ADMISSION: 3/27/2024  ADMISSION  DIAGNOSIS:   Previous  section    Pregnancy     CURRENT DIAGNOSIS: No notes have been filed under this hospital service.  Service: Hospitalist      SUBJECTIVE     29 y.o. yo Female  status post  section at 39w4d doing well. Pain well controlled . Lochia appropriate.       Previous  section    Pregnancy        OBJECTIVE    Vitals  Temp:  Temp:  [96 °F (35.6 °C)-98.3 °F (36.8 °C)] 98.2 °F (36.8 °C)  Temp src: Oral  BP:  BP: (100-138)/(56-77) 120/77  Pulse:  Heart Rate:  [53-91] 74  RR:   Resp:  [16-18] 16    General:  Awake, alert, no acute distress   Cardiac: Regular rate and rhythm    Respiratory: Lungs clear bilaterally, normal respiratory effort    Abdomen: Soft, non-distended, fundus firm, below umbilicus, appropriately tender   Incision: Healing well, no dehiscence, no significant drainage, no erythema or exudate   Pelvis: deferred   Extremities: Calves NT bilaterally, DTR 2+, no clonus noted, trace edema     I/O last 3 completed shifts:  In: 1000 [I.V.:1000]  Out: 1391 [Urine:900; Blood:491]    Lab Results   Component Value Date    WBC 10.26 2024    HGB 9.1 (L) 2024    HCT 27.6 (L) 2024    MCV 87.1 2024     2024    CREATININE 0.63 2020    AST 17 2020    ALT 9 (L) 2020     Results from last 7 days   Lab Units 24  0836   ABO TYPING  A   RH TYPING  Positive   ANTIBODY SCREEN  Negative       INFANT: female       ASSESSMENT: Post operative day 1 from  section. Hgb: 9.1.    PLAN: Doing well. Add PO  ferrous sulfate to daily medications for hgb 9.1. Continue routine supportive care. Discontinue IV, advance diet, may shower.    Ria Sigala CNM  3/28/2024   09:50 EDT      Electronically signed by Deepak Perez MD at 24 1501       Consult Notes (last 72 hours)  Notes from 24 1105 through 24 1105   No notes of this type exist for this encounter.          Discharge Summary        Laura Weaver, APRN at 24 0856       Attestation signed by Remy Lawson MD at 24 1007    I have reviewed this documentation and agree.                      Date of Discharge:  3/29/2024    Discharge Diagnosis: s/p repeat  section    Presenting Problem/History of Present Illness  Pregnancy [Z34.90]     Hospital Course  Patient is a 29 y.o. female presented for scheduled elective repeat  section.  Her pregnancy was complicated by maternal tobacco use, covid during pregnancy, and previous .  She delivered a viable female infant weighing 7lb 11.9 oz with apgars of 8&9. For further information surrounding this procedure please seen operative note. Her postpartum course has been uncomplicated. She is voiding adequately, passing gas, tolerating a regular diet, incision is intact, and she is ambulating without difficulty or assistance. Her pain is well controlled. We have reviewed discharge instructions in detail.     Procedures Performed  Procedure(s):   SECTION REPEAT       Consults:   Consults       No orders found from 2024 to 3/28/2024.            Pertinent Test Results:   WBC   Date Value Ref Range Status   2024 10.26 3.40 - 10.80 10*3/mm3 Final     RBC   Date Value Ref Range Status   2024 3.17 (L) 3.77 - 5.28 10*6/mm3 Final     Hemoglobin   Date Value Ref Range Status   2024 9.1 (L) 12.0 - 15.9 g/dL Final     Hematocrit   Date Value Ref Range Status   2024 27.6 (L) 34.0 - 46.6 % Final     MCV   Date Value Ref Range Status    03/28/2024 87.1 79.0 - 97.0 fL Final     MCH   Date Value Ref Range Status   03/28/2024 28.7 26.6 - 33.0 pg Final     MCHC   Date Value Ref Range Status   03/28/2024 33.0 31.5 - 35.7 g/dL Final     RDW   Date Value Ref Range Status   03/28/2024 12.8 12.3 - 15.4 % Final     RDW-SD   Date Value Ref Range Status   03/28/2024 40.2 37.0 - 54.0 fl Final     MPV   Date Value Ref Range Status   03/28/2024 9.9 6.0 - 12.0 fL Final     Platelets   Date Value Ref Range Status   03/28/2024 179 140 - 450 10*3/mm3 Final     Neutrophil %   Date Value Ref Range Status   03/28/2024 72.7 42.7 - 76.0 % Final     Lymphocyte %   Date Value Ref Range Status   03/28/2024 17.9 (L) 19.6 - 45.3 % Final     Monocyte %   Date Value Ref Range Status   03/28/2024 7.3 5.0 - 12.0 % Final     Eosinophil %   Date Value Ref Range Status   03/28/2024 1.4 0.3 - 6.2 % Final     Basophil %   Date Value Ref Range Status   03/28/2024 0.3 0.0 - 1.5 % Final     Immature Grans %   Date Value Ref Range Status   03/28/2024 0.4 0.0 - 0.5 % Final     Neutrophils, Absolute   Date Value Ref Range Status   03/28/2024 7.46 (H) 1.70 - 7.00 10*3/mm3 Final     Lymphocytes, Absolute   Date Value Ref Range Status   03/28/2024 1.84 0.70 - 3.10 10*3/mm3 Final     Monocytes, Absolute   Date Value Ref Range Status   03/28/2024 0.75 0.10 - 0.90 10*3/mm3 Final     Eosinophils, Absolute   Date Value Ref Range Status   03/28/2024 0.14 0.00 - 0.40 10*3/mm3 Final     Basophils, Absolute   Date Value Ref Range Status   03/28/2024 0.03 0.00 - 0.20 10*3/mm3 Final     Immature Grans, Absolute   Date Value Ref Range Status   03/28/2024 0.04 0.00 - 0.05 10*3/mm3 Final     nRBC   Date Value Ref Range Status   03/28/2024 0.0 0.0 - 0.2 /100 WBC Final       Condition on Discharge:  Stable    Vital Signs  Temp:  [97.9 °F (36.6 °C)-98.6 °F (37 °C)] 98.1 °F (36.7 °C)  Heart Rate:  [78-87] 86  Resp:  [16] 16  BP: (112-129)/(70-79) 129/79    Physical Exam:   See Progress Note    Discharge  Disposition  Home or Self Care    Discharge Medications     Discharge Medications        New Medications        Instructions Start Date   docusate sodium 100 MG capsule  Commonly known as: Colace   100 mg, Oral, 2 Times Daily      ferrous sulfate 325 (65 FE) MG tablet   325 mg, Oral, Daily With Breakfast      ibuprofen 600 MG tablet  Commonly known as: ADVIL,MOTRIN   600 mg, Oral, Every 6 Hours             Continue These Medications        Instructions Start Date   omeprazole 20 MG capsule  Commonly known as: priLOSEC   20 mg, Oral, Daily      prenatal vitamin 27-0.8 27-0.8 MG tablet tablet   Oral, Daily             Stop These Medications      amoxicillin 875 MG tablet  Commonly known as: AMOXIL              Discharge Diet: Regular    Activity at Discharge: Pelvic rest X6 weeks    Education: Warning signs and symptoms given, no tub baths, nothing in the vagina for 6 weeks, no driving for 2 weeks or while talking narcotics     Follow-up Appointments  No future appointments.  Additional Instructions for the Follow-ups that You Need to Schedule       Discharge Follow-up with Specified Provider: Cristi; 2 Weeks   As directed      To: Cristi   Follow Up: 2 Weeks   Follow Up Details: Incision check                Test Results Pending at Discharge       PRIYA Moreno  03/29/24  08:56 EDT    Time: 08:56 EDT           Electronically signed by Remy Lawson MD at 03/29/24 1007

## 2024-03-29 NOTE — PLAN OF CARE
Goal Outcome Evaluation:  Plan of Care Reviewed With: patient, significant other        Progress: improving  Outcome Evaluation: vss. ambulating independently. pain controlled with eras meds and prn heladio. breastfeeding well.

## 2024-03-29 NOTE — PROGRESS NOTES
Section Progress Note    Assessment & Plan     Status post  section: Doing well postoperatively.   Continue current care. Desires discharge home today. We reviewed discharge instructions in detail    2.  DVT Prophylaxis: BMI 42.87.  SCD's while at rest. Encouraged ambulation. On Lovenox.     3. History of postpartum depression and anxiety: Mood is stable at this time. No current medications    4. Anemia: Secondary to delivery loss. Hgb 11.4-->9.1.  cc. Asymptomatic. Continue oral iron supplement    Rh status: A+  Syphilis screen in hospital: non-reactive  Rubella: Not immune-MMR ordered  Gender: female    Subjective     Postpartum Day 2:  Delivery    The patient feels well. The patient denies emotional concerns. Pain is well controlled with current medications. The baby is well.Urinary output is adequate. The patient is ambulating well. The patient is tolerating a normal diet. Patient reports passing flatus.    Objective     Vital signs in last 24 hours:  Temp:  [97.9 °F (36.6 °C)-98.6 °F (37 °C)] 98.1 °F (36.7 °C)  Heart Rate:  [78-87] 86  Resp:  [16] 16  BP: (112-129)/(70-79) 129/79    General:    alert, appears stated age, and cooperative   CV: RRR, no m/r/g   Lungs: CTAB, no wheezes, no respiratory distress   Bowel Sounds:  active   Lochia:  appropriate   Uterine Fundus:   firm   Incision:  healing well, no significant drainage, no dehiscence, no significant erythema, mild ecchymosis, small abrasions from adhesive tape are healing well   DVT Evaluation:  No evidence of DVT seen on physical exam.     Lab Results   Component Value Date    WBC 10.26 2024    HGB 9.1 (L) 2024    HCT 27.6 (L) 2024    MCV 87.1 2024     2024         Laura Weaver, APRN  3/29/2024  08:51 EDT

## 2024-03-31 LAB
BH BB BLOOD EXPIRATION DATE: NORMAL
BH BB BLOOD EXPIRATION DATE: NORMAL
BH BB BLOOD TYPE BARCODE: 6200
BH BB BLOOD TYPE BARCODE: 6200
BH BB DISPENSE STATUS: NORMAL
BH BB DISPENSE STATUS: NORMAL
BH BB PRODUCT CODE: NORMAL
BH BB PRODUCT CODE: NORMAL
BH BB UNIT NUMBER: NORMAL
BH BB UNIT NUMBER: NORMAL
CROSSMATCH INTERPRETATION: NORMAL
CROSSMATCH INTERPRETATION: NORMAL
UNIT  ABO: NORMAL
UNIT  ABO: NORMAL
UNIT  RH: NORMAL
UNIT  RH: NORMAL

## 2024-04-08 ENCOUNTER — PATIENT OUTREACH (OUTPATIENT)
Dept: LABOR AND DELIVERY | Facility: HOSPITAL | Age: 29
End: 2024-04-08
Payer: COMMERCIAL

## 2024-04-08 NOTE — OUTREACH NOTE
[facility]  Unable to Reach       Questions/Answers      Flowsheet Row Responses   Pending Outreach Postpartum Check-in   Call Attempt First   Outcome Left message   Next Call Attempt Date 04/09/24            Inga REES - RN  Maternity Nurse Navigator    4/8/2024, 10:13 EDT

## 2024-04-09 ENCOUNTER — PATIENT OUTREACH (OUTPATIENT)
Dept: LABOR AND DELIVERY | Facility: HOSPITAL | Age: 29
End: 2024-04-09
Payer: COMMERCIAL

## 2024-04-09 ENCOUNTER — OFFICE VISIT (OUTPATIENT)
Dept: OBSTETRICS AND GYNECOLOGY | Facility: CLINIC | Age: 29
End: 2024-04-09
Payer: COMMERCIAL

## 2024-04-09 VITALS
SYSTOLIC BLOOD PRESSURE: 112 MMHG | HEIGHT: 63 IN | DIASTOLIC BLOOD PRESSURE: 72 MMHG | BODY MASS INDEX: 39.87 KG/M2 | WEIGHT: 225 LBS

## 2024-04-09 DIAGNOSIS — Z98.890 POST-OPERATIVE STATE: Primary | ICD-10-CM

## 2024-04-09 PROCEDURE — 0503F POSTPARTUM CARE VISIT: CPT | Performed by: OBSTETRICS & GYNECOLOGY

## 2024-04-09 PROCEDURE — 1160F RVW MEDS BY RX/DR IN RCRD: CPT | Performed by: OBSTETRICS & GYNECOLOGY

## 2024-04-09 PROCEDURE — 1159F MED LIST DOCD IN RCRD: CPT | Performed by: OBSTETRICS & GYNECOLOGY

## 2024-04-09 NOTE — OUTREACH NOTE
[facility]  Unable to Reach       Questions/Answers      Flowsheet Row Responses   Pending Outreach Postpartum Check-in   Call Attempt Second   Outcome Left message   Next Call Attempt Date 04/10/24            Inga REES - RN  Maternity Nurse Navigator    4/9/2024, 11:08 EDT

## 2024-04-09 NOTE — PROGRESS NOTES
Subjective   Cammy Zaldivar is a 29 y.o. female.     Cc:  Postoperative check    History of Present Illness - Patient is a 29 year old female who underwent repeat low transverse  2 weeks ago.  Surgery was uncomplicated.  She reports some drainage from incision.    The following portions of the patient's history were reviewed and updated as appropriate: She  has a past medical history of Anxiety, Depression, and Gestational hypertension.  She  reports that she has been smoking cigarettes. She has never used smokeless tobacco. She reports that she does not drink alcohol and does not use drugs.  Current Outpatient Medications   Medication Sig Dispense Refill    docusate sodium (Colace) 100 MG capsule Take 1 capsule by mouth 2 (Two) Times a Day. 60 capsule 1    ferrous sulfate 325 (65 FE) MG tablet Take 1 tablet by mouth Daily With Breakfast. 30 tablet 1    ibuprofen (ADVIL,MOTRIN) 600 MG tablet Take 1 tablet by mouth Every 6 (Six) Hours. 90 tablet 1    omeprazole (priLOSEC) 20 MG capsule Take 1 capsule by mouth Daily.      Prenatal Vit-Fe Fumarate-FA (prenatal vitamin 27-0.8) 27-0.8 MG tablet tablet Take  by mouth Daily.       No current facility-administered medications for this visit.     She is allergic to contrast dye (echo or unknown ct/mr)..    Review of Systems   Constitutional:  Negative for chills and fever.   Gastrointestinal:  Negative for nausea and vomiting.       Objective   Physical Exam  Vitals reviewed.   Constitutional:       Appearance: Normal appearance.   Abdominal:      Comments: Incision healing well without erythema, induration   Neurological:      Mental Status: She is alert.   Psychiatric:         Mood and Affect: Mood normal.         Behavior: Behavior normal.         Thought Content: Thought content normal.         Judgment: Judgment normal.         Assessment & Plan   Diagnoses and all orders for this visit:    1. Post-operative state (Primary)  -  Discussed wound care and follow  up in 4 weeks for postpartum check.  Patient can return to work if it involves no lifting over 25 lbs.    Jacob Colin MD

## 2024-04-10 ENCOUNTER — PATIENT OUTREACH (OUTPATIENT)
Dept: LABOR AND DELIVERY | Facility: HOSPITAL | Age: 29
End: 2024-04-10
Payer: COMMERCIAL

## 2024-04-10 NOTE — OUTREACH NOTE
Motherhood Connection  Unable to Reach       Questions/Answers      Flowsheet Row Responses   Pending Outreach Postpartum Check-in   Call Attempt Third   Outcome Left message, MyChart message sent to patient          UTRx3 for PP check in, will send to RN call center.     Inga REES - RN  Maternity Nurse Navigator    4/10/2024, 09:13 EDT

## 2024-04-17 ENCOUNTER — PATIENT OUTREACH (OUTPATIENT)
Dept: CALL CENTER | Facility: HOSPITAL | Age: 29
End: 2024-04-17
Payer: COMMERCIAL

## 2024-04-17 NOTE — OUTREACH NOTE
Motherhood Connection Survey      Flowsheet Row Responses   [facility] [place] [facility]   [facility] successful? Yes   Call start time 1046   Call end time 1048   Baby sex Girl    discharged home with mother? Yes   Baby sex Girl   Delivery type    Emotional state Acceptance   Family support Yes   Do you have all necessary resources to care for you and your baby?  Yes   Have members of your household adjusted to your baby? Yes   Did you have any problems with pre-eclampsia during this pregnancy? No   Do you have any of the following: No Issues   Did you have blood glucose issues during this pregnancy No   Lochia amount Light   Lochia per patient report Serosa   Did you have an episiotomy/tear/abdominal incision? Yes   Additional comments Healing well with no s/s of infection   Feeding Method Combination   Frequency Takes formula and puts to breast as well   Breast Condition No   Nipple Condition No   Feeding tolerance Weight gain, Wet/dirty diapers   Number of wet diapers x 24 hours numerous times a day   Last BM x 24 hours daily   Umbilical Cord No reported signs or symptoms   Umbilical cord comments Cord has fallen off and dried up well without s/s of infection   Circumcision comments n/a   Where does the baby usually sleep? Bassinet   Are there stuffed animals, toys, pillows, quilts, blankets, wedges, positioners, bumpers or other loose bedding in the infant's sleeping environment? No   Does the baby ever share a sleep surface in a bed, couch, recliner or other? No   What position do you lay your baby down to sleep? Back   Are you and/or other caregivers smoking inside or outside the baby's home? No   Mom appointment comments: Mom and baby have both been to followups   Call completed? Yes   How satisfied were you with the Motherhood Connection Program? 5              Dameon ST - Registered Nurse

## (undated) DEVICE — SOL IRR H2O BTL 1000ML STRL

## (undated) DEVICE — 3M™ TEGADERM™ TRANSPARENT FILM DRESSING FRAME STYLE, 1627, 4 IN X 10 IN (10 CM X 25 CM), 20/CT 4CT/CASE: Brand: 3M™ TEGADERM™

## (undated) DEVICE — GLV SURG BIOGEL LTX PF 7 1/2

## (undated) DEVICE — SUT MNCRYL PLS ANTIB UD 4/0 PS2 18IN

## (undated) DEVICE — ANTIBACTERIAL UNDYED BRAIDED (POLYGLACTIN 910), SYNTHETIC ABSORBABLE SUTURE: Brand: COATED VICRYL

## (undated) DEVICE — SUT MNCRYL 0/0 CTX 36IN Y398H